# Patient Record
Sex: FEMALE | Race: WHITE | Employment: OTHER | URBAN - NONMETROPOLITAN AREA
[De-identification: names, ages, dates, MRNs, and addresses within clinical notes are randomized per-mention and may not be internally consistent; named-entity substitution may affect disease eponyms.]

---

## 2018-04-09 ENCOUNTER — APPOINTMENT (OUTPATIENT)
Dept: CT IMAGING | Age: 48
DRG: 287 | End: 2018-04-09
Payer: MEDICARE

## 2018-04-09 ENCOUNTER — HOSPITAL ENCOUNTER (INPATIENT)
Age: 48
LOS: 1 days | Discharge: HOME | DRG: 287 | End: 2018-04-10
Attending: INTERNAL MEDICINE | Admitting: INTERNAL MEDICINE
Payer: MEDICARE

## 2018-04-09 ENCOUNTER — APPOINTMENT (OUTPATIENT)
Dept: GENERAL RADIOLOGY | Age: 48
DRG: 287 | End: 2018-04-09
Payer: MEDICARE

## 2018-04-09 DIAGNOSIS — F41.1 ANXIETY, GENERALIZED: ICD-10-CM

## 2018-04-09 DIAGNOSIS — R07.9 CHEST PAIN, UNSPECIFIED TYPE: Primary | ICD-10-CM

## 2018-04-09 LAB
ALBUMIN SERPL-MCNC: 4.1 G/DL (ref 3.5–5.1)
ALP BLD-CCNC: 85 U/L (ref 38–126)
ALT SERPL-CCNC: 42 U/L (ref 11–66)
ANION GAP SERPL CALCULATED.3IONS-SCNC: 17 MEQ/L (ref 8–16)
AST SERPL-CCNC: 38 U/L (ref 5–40)
BASOPHILS # BLD: 0.7 %
BASOPHILS ABSOLUTE: 0.1 THOU/MM3 (ref 0–0.1)
BILIRUB SERPL-MCNC: 0.3 MG/DL (ref 0.3–1.2)
BUN BLDV-MCNC: 10 MG/DL (ref 7–22)
CALCIUM SERPL-MCNC: 9.3 MG/DL (ref 8.5–10.5)
CHLORIDE BLD-SCNC: 99 MEQ/L (ref 98–111)
CO2: 21 MEQ/L (ref 23–33)
CREAT SERPL-MCNC: 0.5 MG/DL (ref 0.4–1.2)
D-DIMER QUANTITATIVE: 838 NG/ML FEU (ref 0–500)
EKG ATRIAL RATE: 100 BPM
EKG ATRIAL RATE: 100 BPM
EKG P AXIS: 54 DEGREES
EKG P AXIS: 54 DEGREES
EKG P-R INTERVAL: 160 MS
EKG P-R INTERVAL: 164 MS
EKG Q-T INTERVAL: 346 MS
EKG Q-T INTERVAL: 360 MS
EKG QRS DURATION: 84 MS
EKG QRS DURATION: 88 MS
EKG QTC CALCULATION (BAZETT): 446 MS
EKG QTC CALCULATION (BAZETT): 464 MS
EKG R AXIS: -23 DEGREES
EKG R AXIS: -4 DEGREES
EKG T AXIS: 15 DEGREES
EKG T AXIS: 51 DEGREES
EKG VENTRICULAR RATE: 100 BPM
EKG VENTRICULAR RATE: 100 BPM
EOSINOPHIL # BLD: 0.4 %
EOSINOPHILS ABSOLUTE: 0 THOU/MM3 (ref 0–0.4)
GFR SERPL CREATININE-BSD FRML MDRD: > 90 ML/MIN/1.73M2
GLUCOSE BLD-MCNC: 208 MG/DL (ref 70–108)
HCT VFR BLD CALC: 47.2 % (ref 37–47)
HEMOGLOBIN: 15.9 GM/DL (ref 12–16)
LIPASE: 26.7 U/L (ref 5.6–51.3)
LYMPHOCYTES # BLD: 20.9 %
LYMPHOCYTES ABSOLUTE: 1.9 THOU/MM3 (ref 1–4.8)
MCH RBC QN AUTO: 28.8 PG (ref 27–31)
MCHC RBC AUTO-ENTMCNC: 33.7 GM/DL (ref 33–37)
MCV RBC AUTO: 85.5 FL (ref 81–99)
MONOCYTES # BLD: 6.2 %
MONOCYTES ABSOLUTE: 0.6 THOU/MM3 (ref 0.4–1.3)
NUCLEATED RED BLOOD CELLS: 0 /100 WBC
OSMOLALITY CALCULATION: 278.9 MOSMOL/KG (ref 275–300)
PDW BLD-RTO: 13.5 % (ref 11.5–14.5)
PLATELET # BLD: 164 THOU/MM3 (ref 130–400)
PMV BLD AUTO: 10.7 FL (ref 7.4–10.4)
POTASSIUM SERPL-SCNC: 4 MEQ/L (ref 3.5–5.2)
RBC # BLD: 5.52 MILL/MM3 (ref 4.2–5.4)
SEG NEUTROPHILS: 71.8 %
SEGMENTED NEUTROPHILS ABSOLUTE COUNT: 6.6 THOU/MM3 (ref 1.8–7.7)
SODIUM BLD-SCNC: 137 MEQ/L (ref 135–145)
TOTAL PROTEIN: 7.4 G/DL (ref 6.1–8)
TROPONIN T: < 0.01 NG/ML
WBC # BLD: 9.2 THOU/MM3 (ref 4.8–10.8)

## 2018-04-09 PROCEDURE — 96365 THER/PROPH/DIAG IV INF INIT: CPT

## 2018-04-09 PROCEDURE — 71046 X-RAY EXAM CHEST 2 VIEWS: CPT

## 2018-04-09 PROCEDURE — 96376 TX/PRO/DX INJ SAME DRUG ADON: CPT

## 2018-04-09 PROCEDURE — 99285 EMERGENCY DEPT VISIT HI MDM: CPT

## 2018-04-09 PROCEDURE — 99218 PR INITIAL OBSERVATION CARE/DAY 30 MINUTES: CPT | Performed by: INTERNAL MEDICINE

## 2018-04-09 PROCEDURE — 6360000002 HC RX W HCPCS: Performed by: INTERNAL MEDICINE

## 2018-04-09 PROCEDURE — 6360000004 HC RX CONTRAST MEDICATION: Performed by: PHYSICIAN ASSISTANT

## 2018-04-09 PROCEDURE — 6370000000 HC RX 637 (ALT 250 FOR IP): Performed by: PHYSICIAN ASSISTANT

## 2018-04-09 PROCEDURE — 2580000003 HC RX 258: Performed by: INTERNAL MEDICINE

## 2018-04-09 PROCEDURE — 85025 COMPLETE CBC W/AUTO DIFF WBC: CPT

## 2018-04-09 PROCEDURE — G0378 HOSPITAL OBSERVATION PER HR: HCPCS

## 2018-04-09 PROCEDURE — 96372 THER/PROPH/DIAG INJ SC/IM: CPT

## 2018-04-09 PROCEDURE — 84484 ASSAY OF TROPONIN QUANT: CPT

## 2018-04-09 PROCEDURE — 1200000003 HC TELEMETRY R&B

## 2018-04-09 PROCEDURE — 93005 ELECTROCARDIOGRAM TRACING: CPT | Performed by: INTERNAL MEDICINE

## 2018-04-09 PROCEDURE — 6370000000 HC RX 637 (ALT 250 FOR IP): Performed by: INTERNAL MEDICINE

## 2018-04-09 PROCEDURE — 36415 COLL VENOUS BLD VENIPUNCTURE: CPT

## 2018-04-09 PROCEDURE — 96375 TX/PRO/DX INJ NEW DRUG ADDON: CPT

## 2018-04-09 PROCEDURE — 2500000003 HC RX 250 WO HCPCS: Performed by: INTERNAL MEDICINE

## 2018-04-09 PROCEDURE — 93005 ELECTROCARDIOGRAM TRACING: CPT | Performed by: PHYSICIAN ASSISTANT

## 2018-04-09 PROCEDURE — 85379 FIBRIN DEGRADATION QUANT: CPT

## 2018-04-09 PROCEDURE — 6360000002 HC RX W HCPCS: Performed by: PHYSICIAN ASSISTANT

## 2018-04-09 PROCEDURE — 96374 THER/PROPH/DIAG INJ IV PUSH: CPT

## 2018-04-09 PROCEDURE — 96366 THER/PROPH/DIAG IV INF ADDON: CPT

## 2018-04-09 PROCEDURE — 80053 COMPREHEN METABOLIC PANEL: CPT

## 2018-04-09 PROCEDURE — S0028 INJECTION, FAMOTIDINE, 20 MG: HCPCS | Performed by: INTERNAL MEDICINE

## 2018-04-09 PROCEDURE — 83690 ASSAY OF LIPASE: CPT

## 2018-04-09 PROCEDURE — 71275 CT ANGIOGRAPHY CHEST: CPT

## 2018-04-09 RX ORDER — ACETAMINOPHEN 325 MG/1
650 TABLET ORAL EVERY 4 HOURS PRN
Status: DISCONTINUED | OUTPATIENT
Start: 2018-04-09 | End: 2018-04-09 | Stop reason: SDUPTHER

## 2018-04-09 RX ORDER — PAROXETINE HYDROCHLORIDE 20 MG/1
40 TABLET, FILM COATED ORAL EVERY MORNING
Status: DISCONTINUED | OUTPATIENT
Start: 2018-04-10 | End: 2018-04-10 | Stop reason: HOSPADM

## 2018-04-09 RX ORDER — TIZANIDINE 4 MG/1
4 TABLET ORAL EVERY 6 HOURS PRN
Status: DISCONTINUED | OUTPATIENT
Start: 2018-04-09 | End: 2018-04-10 | Stop reason: HOSPADM

## 2018-04-09 RX ORDER — SODIUM CHLORIDE 0.9 % (FLUSH) 0.9 %
10 SYRINGE (ML) INJECTION EVERY 12 HOURS SCHEDULED
Status: DISCONTINUED | OUTPATIENT
Start: 2018-04-09 | End: 2018-04-10 | Stop reason: SDUPTHER

## 2018-04-09 RX ORDER — SODIUM CHLORIDE 0.9 % (FLUSH) 0.9 %
10 SYRINGE (ML) INJECTION EVERY 12 HOURS SCHEDULED
Status: DISCONTINUED | OUTPATIENT
Start: 2018-04-09 | End: 2018-04-09 | Stop reason: SDUPTHER

## 2018-04-09 RX ORDER — METOCLOPRAMIDE HYDROCHLORIDE 5 MG/ML
10 INJECTION INTRAMUSCULAR; INTRAVENOUS EVERY 6 HOURS PRN
Status: DISCONTINUED | OUTPATIENT
Start: 2018-04-09 | End: 2018-04-10 | Stop reason: HOSPADM

## 2018-04-09 RX ORDER — MORPHINE SULFATE 2 MG/ML
2 INJECTION, SOLUTION INTRAMUSCULAR; INTRAVENOUS ONCE
Status: DISCONTINUED | OUTPATIENT
Start: 2018-04-09 | End: 2018-04-09 | Stop reason: DRUGHIGH

## 2018-04-09 RX ORDER — SODIUM CHLORIDE 0.9 % (FLUSH) 0.9 %
10 SYRINGE (ML) INJECTION PRN
Status: DISCONTINUED | OUTPATIENT
Start: 2018-04-09 | End: 2018-04-10 | Stop reason: SDUPTHER

## 2018-04-09 RX ORDER — TIZANIDINE 4 MG/1
4 TABLET ORAL 3 TIMES DAILY
Status: DISCONTINUED | OUTPATIENT
Start: 2018-04-09 | End: 2018-04-09

## 2018-04-09 RX ORDER — DEXTROSE MONOHYDRATE 50 MG/ML
100 INJECTION, SOLUTION INTRAVENOUS PRN
Status: DISCONTINUED | OUTPATIENT
Start: 2018-04-09 | End: 2018-04-10 | Stop reason: HOSPADM

## 2018-04-09 RX ORDER — PREGABALIN 75 MG/1
150 CAPSULE ORAL EVERY 6 HOURS
Status: DISCONTINUED | OUTPATIENT
Start: 2018-04-09 | End: 2018-04-10 | Stop reason: HOSPADM

## 2018-04-09 RX ORDER — MORPHINE SULFATE 2 MG/ML
2 INJECTION, SOLUTION INTRAMUSCULAR; INTRAVENOUS ONCE
Status: COMPLETED | OUTPATIENT
Start: 2018-04-09 | End: 2018-04-09

## 2018-04-09 RX ORDER — NICOTINE 21 MG/24HR
1 PATCH, TRANSDERMAL 24 HOURS TRANSDERMAL DAILY
Status: DISCONTINUED | OUTPATIENT
Start: 2018-04-09 | End: 2018-04-10 | Stop reason: HOSPADM

## 2018-04-09 RX ORDER — MORPHINE SULFATE 2 MG/ML
2 INJECTION, SOLUTION INTRAMUSCULAR; INTRAVENOUS ONCE
Status: DISCONTINUED | OUTPATIENT
Start: 2018-04-09 | End: 2018-04-09

## 2018-04-09 RX ORDER — DEXTROSE MONOHYDRATE 25 G/50ML
12.5 INJECTION, SOLUTION INTRAVENOUS PRN
Status: DISCONTINUED | OUTPATIENT
Start: 2018-04-09 | End: 2018-04-10 | Stop reason: HOSPADM

## 2018-04-09 RX ORDER — SODIUM CHLORIDE 0.9 % (FLUSH) 0.9 %
10 SYRINGE (ML) INJECTION PRN
Status: DISCONTINUED | OUTPATIENT
Start: 2018-04-09 | End: 2018-04-09 | Stop reason: SDUPTHER

## 2018-04-09 RX ORDER — ONDANSETRON 2 MG/ML
4 INJECTION INTRAMUSCULAR; INTRAVENOUS EVERY 6 HOURS PRN
Status: DISCONTINUED | OUTPATIENT
Start: 2018-04-09 | End: 2018-04-10

## 2018-04-09 RX ORDER — CLOPIDOGREL BISULFATE 75 MG/1
75 TABLET ORAL ONCE
Status: COMPLETED | OUTPATIENT
Start: 2018-04-09 | End: 2018-04-09

## 2018-04-09 RX ORDER — SODIUM CHLORIDE 9 MG/ML
INJECTION, SOLUTION INTRAVENOUS CONTINUOUS
Status: DISCONTINUED | OUTPATIENT
Start: 2018-04-09 | End: 2018-04-10

## 2018-04-09 RX ORDER — LOSARTAN POTASSIUM 25 MG/1
50 TABLET ORAL DAILY
COMMUNITY
End: 2022-05-31 | Stop reason: ALTCHOICE

## 2018-04-09 RX ORDER — MORPHINE SULFATE 4 MG/ML
4 INJECTION, SOLUTION INTRAMUSCULAR; INTRAVENOUS ONCE
Status: DISCONTINUED | OUTPATIENT
Start: 2018-04-09 | End: 2018-04-10

## 2018-04-09 RX ORDER — METOPROLOL TARTRATE 50 MG/1
25 TABLET, FILM COATED ORAL 2 TIMES DAILY
Status: DISCONTINUED | OUTPATIENT
Start: 2018-04-09 | End: 2018-04-10 | Stop reason: HOSPADM

## 2018-04-09 RX ORDER — CLONAZEPAM 1 MG/1
1 TABLET ORAL 2 TIMES DAILY PRN
Status: DISCONTINUED | OUTPATIENT
Start: 2018-04-09 | End: 2018-04-10 | Stop reason: HOSPADM

## 2018-04-09 RX ORDER — ACETAMINOPHEN 325 MG/1
650 TABLET ORAL EVERY 4 HOURS PRN
Status: DISCONTINUED | OUTPATIENT
Start: 2018-04-09 | End: 2018-04-10

## 2018-04-09 RX ORDER — NICOTINE POLACRILEX 4 MG
15 LOZENGE BUCCAL PRN
Status: DISCONTINUED | OUTPATIENT
Start: 2018-04-09 | End: 2018-04-10 | Stop reason: HOSPADM

## 2018-04-09 RX ORDER — PERPHENAZINE 16 MG
TABLET ORAL DAILY
COMMUNITY
End: 2020-02-17

## 2018-04-09 RX ORDER — ONDANSETRON 4 MG/1
4 TABLET, ORALLY DISINTEGRATING ORAL ONCE
Status: COMPLETED | OUTPATIENT
Start: 2018-04-09 | End: 2018-04-09

## 2018-04-09 RX ORDER — NITROGLYCERIN 0.4 MG/1
0.4 TABLET SUBLINGUAL EVERY 5 MIN PRN
Status: DISCONTINUED | OUTPATIENT
Start: 2018-04-09 | End: 2018-04-09 | Stop reason: HOSPADM

## 2018-04-09 RX ORDER — TRAMADOL HYDROCHLORIDE 50 MG/1
50 TABLET ORAL EVERY 6 HOURS PRN
Status: DISCONTINUED | OUTPATIENT
Start: 2018-04-09 | End: 2018-04-10 | Stop reason: HOSPADM

## 2018-04-09 RX ORDER — MONTELUKAST SODIUM 10 MG/1
10 TABLET ORAL NIGHTLY
COMMUNITY
End: 2020-02-17

## 2018-04-09 RX ORDER — LORAZEPAM 2 MG/ML
1 INJECTION INTRAMUSCULAR ONCE
Status: COMPLETED | OUTPATIENT
Start: 2018-04-09 | End: 2018-04-09

## 2018-04-09 RX ORDER — HALOPERIDOL 5 MG/ML
1 INJECTION INTRAMUSCULAR EVERY 6 HOURS PRN
Status: DISCONTINUED | OUTPATIENT
Start: 2018-04-09 | End: 2018-04-10 | Stop reason: HOSPADM

## 2018-04-09 RX ADMIN — IOPAMIDOL 80 ML: 755 INJECTION, SOLUTION INTRAVENOUS at 10:30

## 2018-04-09 RX ADMIN — TRAMADOL HYDROCHLORIDE 50 MG: 50 TABLET, FILM COATED ORAL at 14:47

## 2018-04-09 RX ADMIN — NITROGLYCERIN 1 INCH: 20 OINTMENT TOPICAL at 21:49

## 2018-04-09 RX ADMIN — SODIUM CHLORIDE: 9 INJECTION, SOLUTION INTRAVENOUS at 14:41

## 2018-04-09 RX ADMIN — MORPHINE SULFATE 2 MG: 2 INJECTION, SOLUTION INTRAMUSCULAR; INTRAVENOUS at 17:57

## 2018-04-09 RX ADMIN — NITROGLYCERIN 1 INCH: 20 OINTMENT TOPICAL at 14:53

## 2018-04-09 RX ADMIN — TRAMADOL HYDROCHLORIDE 50 MG: 50 TABLET, FILM COATED ORAL at 21:49

## 2018-04-09 RX ADMIN — PREGABALIN 150 MG: 75 CAPSULE ORAL at 17:57

## 2018-04-09 RX ADMIN — NITROGLYCERIN 0.4 MG: 0.4 TABLET SUBLINGUAL at 14:47

## 2018-04-09 RX ADMIN — LORAZEPAM 1 MG: 2 INJECTION INTRAMUSCULAR; INTRAVENOUS at 10:59

## 2018-04-09 RX ADMIN — FAMOTIDINE 20 MG: 10 INJECTION, SOLUTION INTRAVENOUS at 21:49

## 2018-04-09 RX ADMIN — SODIUM CHLORIDE: 9 INJECTION, SOLUTION INTRAVENOUS at 21:49

## 2018-04-09 RX ADMIN — ONDANSETRON 4 MG: 4 TABLET, ORALLY DISINTEGRATING ORAL at 07:57

## 2018-04-09 RX ADMIN — METOPROLOL TARTRATE 25 MG: 50 TABLET, FILM COATED ORAL at 21:49

## 2018-04-09 RX ADMIN — ENOXAPARIN SODIUM 60 MG: 60 INJECTION SUBCUTANEOUS at 17:58

## 2018-04-09 RX ADMIN — PREGABALIN 150 MG: 75 CAPSULE ORAL at 23:59

## 2018-04-09 RX ADMIN — NITROGLYCERIN 0.4 MG: 0.4 TABLET SUBLINGUAL at 11:01

## 2018-04-09 RX ADMIN — FAMOTIDINE 20 MG: 10 INJECTION, SOLUTION INTRAVENOUS at 14:50

## 2018-04-09 RX ADMIN — NITROGLYCERIN 0.4 MG: 0.4 TABLET SUBLINGUAL at 08:07

## 2018-04-09 RX ADMIN — CLOPIDOGREL 75 MG: 75 TABLET, FILM COATED ORAL at 18:06

## 2018-04-09 ASSESSMENT — PAIN DESCRIPTION - LOCATION
LOCATION_2: BACK
LOCATION: CHEST
LOCATION: CHEST
LOCATION_2: GENERALIZED
LOCATION: CHEST;SHOULDER
LOCATION: CHEST
LOCATION_2: GENERALIZED
LOCATION: CHEST;SHOULDER

## 2018-04-09 ASSESSMENT — PAIN SCALES - GENERAL
PAINLEVEL_OUTOF10: 10
PAINLEVEL_OUTOF10: 4
PAINLEVEL_OUTOF10: 6
PAINLEVEL_OUTOF10: 9
PAINLEVEL_OUTOF10: 7

## 2018-04-09 ASSESSMENT — PAIN DESCRIPTION - ONSET
ONSET_2: ON-GOING
ONSET: ON-GOING

## 2018-04-09 ASSESSMENT — PAIN DESCRIPTION - PAIN TYPE
TYPE_2: CHRONIC PAIN
TYPE: ACUTE PAIN
TYPE_2: CHRONIC PAIN
TYPE_2: CHRONIC PAIN
TYPE: ACUTE PAIN

## 2018-04-09 ASSESSMENT — PAIN DESCRIPTION - PROGRESSION
CLINICAL_PROGRESSION_2: NOT CHANGED
CLINICAL_PROGRESSION: NOT CHANGED
CLINICAL_PROGRESSION: NOT CHANGED
CLINICAL_PROGRESSION: GRADUALLY IMPROVING

## 2018-04-09 ASSESSMENT — ENCOUNTER SYMPTOMS
PHOTOPHOBIA: 0
SHORTNESS OF BREATH: 1
ABDOMINAL PAIN: 0
NAUSEA: 1
DIARRHEA: 0
COUGH: 0
SORE THROAT: 0
RHINORRHEA: 0
VOMITING: 0
BACK PAIN: 0

## 2018-04-09 ASSESSMENT — PAIN DESCRIPTION - ORIENTATION
ORIENTATION_2: MID
ORIENTATION: LEFT
ORIENTATION: MID
ORIENTATION: LEFT

## 2018-04-09 ASSESSMENT — PAIN DESCRIPTION - FREQUENCY
FREQUENCY: CONTINUOUS

## 2018-04-09 ASSESSMENT — PAIN DESCRIPTION - DESCRIPTORS
DESCRIPTORS: ACHING
DESCRIPTORS: ACHING;HEAVINESS
DESCRIPTORS_2: ACHING
DESCRIPTORS: DISCOMFORT
DESCRIPTORS_2: ACHING
DESCRIPTORS: ACHING
DESCRIPTORS: ACHING;HEAVINESS

## 2018-04-09 ASSESSMENT — HEART SCORE: ECG: 0

## 2018-04-09 ASSESSMENT — PAIN DESCRIPTION - INTENSITY
RATING_2: 4
RATING_2: 6
RATING_2: 8

## 2018-04-09 ASSESSMENT — PAIN DESCRIPTION - DURATION: DURATION_2: CONTINUOUS

## 2018-04-10 ENCOUNTER — APPOINTMENT (OUTPATIENT)
Dept: CARDIAC CATH/INVASIVE PROCEDURES | Age: 48
DRG: 287 | End: 2018-04-10
Payer: MEDICARE

## 2018-04-10 VITALS
SYSTOLIC BLOOD PRESSURE: 113 MMHG | BODY MASS INDEX: 45.99 KG/M2 | TEMPERATURE: 98.2 F | RESPIRATION RATE: 18 BRPM | HEIGHT: 67 IN | OXYGEN SATURATION: 95 % | DIASTOLIC BLOOD PRESSURE: 61 MMHG | HEART RATE: 61 BPM | WEIGHT: 293 LBS

## 2018-04-10 PROBLEM — I20.0 UNSTABLE ANGINA (HCC): Status: ACTIVE | Noted: 2018-04-10

## 2018-04-10 PROBLEM — E11.69 DIABETES MELLITUS TYPE 2 IN OBESE (HCC): Status: ACTIVE | Noted: 2018-04-10

## 2018-04-10 PROBLEM — E66.9 DIABETES MELLITUS TYPE 2 IN OBESE (HCC): Status: ACTIVE | Noted: 2018-04-10

## 2018-04-10 PROBLEM — F17.200 TOBACCO USE DISORDER: Status: ACTIVE | Noted: 2018-04-10

## 2018-04-10 LAB
ABO: NORMAL
AMPHETAMINE+METHAMPHETAMINE URINE SCREEN: NEGATIVE
ANTIBODY SCREEN: NORMAL
AVERAGE GLUCOSE: 174 MG/DL (ref 70–126)
BARBITURATE QUANTITATIVE URINE: NEGATIVE
BENZODIAZEPINE QUANTITATIVE URINE: NEGATIVE
CANNABINOID QUANTITATIVE URINE: NEGATIVE
CHOLESTEROL, TOTAL: 178 MG/DL (ref 100–199)
COCAINE METABOLITE QUANTITATIVE URINE: NEGATIVE
EKG ATRIAL RATE: 54 BPM
EKG P AXIS: 48 DEGREES
EKG P-R INTERVAL: 188 MS
EKG Q-T INTERVAL: 442 MS
EKG QRS DURATION: 92 MS
EKG QTC CALCULATION (BAZETT): 419 MS
EKG R AXIS: 12 DEGREES
EKG T AXIS: 13 DEGREES
EKG VENTRICULAR RATE: 54 BPM
GLUCOSE BLD-MCNC: 115 MG/DL (ref 70–108)
GLUCOSE BLD-MCNC: 133 MG/DL (ref 70–108)
HBA1C MFR BLD: 7.8 % (ref 4.4–6.4)
HCT VFR BLD CALC: 40.5 % (ref 37–47)
HCT VFR BLD CALC: 41.7 % (ref 37–47)
HDLC SERPL-MCNC: 26 MG/DL
HEMOGLOBIN: 13.5 GM/DL (ref 12–16)
HEMOGLOBIN: 14 GM/DL (ref 12–16)
INR BLD: 0.99 (ref 0.85–1.13)
LACTIC ACID: 1 MMOL/L (ref 0.5–2.2)
LDL CHOLESTEROL CALCULATED: 95 MG/DL
LV EF: 55 %
LVEF MODALITY: NORMAL
MCH RBC QN AUTO: 28.7 PG (ref 27–31)
MCH RBC QN AUTO: 28.9 PG (ref 27–31)
MCHC RBC AUTO-ENTMCNC: 33.4 GM/DL (ref 33–37)
MCHC RBC AUTO-ENTMCNC: 33.6 GM/DL (ref 33–37)
MCV RBC AUTO: 85.9 FL (ref 81–99)
MCV RBC AUTO: 86.1 FL (ref 81–99)
OPIATES, URINE: POSITIVE
OXYCODONE: NEGATIVE
PDW BLD-RTO: 13.8 % (ref 11.5–14.5)
PDW BLD-RTO: 13.8 % (ref 11.5–14.5)
PHENCYCLIDINE QUANTITATIVE URINE: NEGATIVE
PLATELET # BLD: 150 THOU/MM3 (ref 130–400)
PLATELET # BLD: 155 THOU/MM3 (ref 130–400)
PMV BLD AUTO: 10.2 FL (ref 7.4–10.4)
PMV BLD AUTO: 10.3 FL (ref 7.4–10.4)
RBC # BLD: 4.71 MILL/MM3 (ref 4.2–5.4)
RBC # BLD: 4.84 MILL/MM3 (ref 4.2–5.4)
RH FACTOR: NORMAL
TRIGL SERPL-MCNC: 284 MG/DL (ref 0–199)
WBC # BLD: 7.6 THOU/MM3 (ref 4.8–10.8)
WBC # BLD: 8 THOU/MM3 (ref 4.8–10.8)

## 2018-04-10 PROCEDURE — 80307 DRUG TEST PRSMV CHEM ANLYZR: CPT

## 2018-04-10 PROCEDURE — B2151ZZ FLUOROSCOPY OF LEFT HEART USING LOW OSMOLAR CONTRAST: ICD-10-PCS | Performed by: INTERNAL MEDICINE

## 2018-04-10 PROCEDURE — 83605 ASSAY OF LACTIC ACID: CPT

## 2018-04-10 PROCEDURE — 85610 PROTHROMBIN TIME: CPT

## 2018-04-10 PROCEDURE — B2111ZZ FLUOROSCOPY OF MULTIPLE CORONARY ARTERIES USING LOW OSMOLAR CONTRAST: ICD-10-PCS | Performed by: INTERNAL MEDICINE

## 2018-04-10 PROCEDURE — 2580000003 HC RX 258: Performed by: INTERNAL MEDICINE

## 2018-04-10 PROCEDURE — 6370000000 HC RX 637 (ALT 250 FOR IP): Performed by: INTERNAL MEDICINE

## 2018-04-10 PROCEDURE — 86901 BLOOD TYPING SEROLOGIC RH(D): CPT

## 2018-04-10 PROCEDURE — 93005 ELECTROCARDIOGRAM TRACING: CPT | Performed by: INTERNAL MEDICINE

## 2018-04-10 PROCEDURE — 85027 COMPLETE CBC AUTOMATED: CPT

## 2018-04-10 PROCEDURE — 86850 RBC ANTIBODY SCREEN: CPT

## 2018-04-10 PROCEDURE — 83036 HEMOGLOBIN GLYCOSYLATED A1C: CPT

## 2018-04-10 PROCEDURE — 96372 THER/PROPH/DIAG INJ SC/IM: CPT

## 2018-04-10 PROCEDURE — 6360000002 HC RX W HCPCS

## 2018-04-10 PROCEDURE — 6360000002 HC RX W HCPCS: Performed by: INTERNAL MEDICINE

## 2018-04-10 PROCEDURE — 93306 TTE W/DOPPLER COMPLETE: CPT

## 2018-04-10 PROCEDURE — 99223 1ST HOSP IP/OBS HIGH 75: CPT | Performed by: INTERNAL MEDICINE

## 2018-04-10 PROCEDURE — 2500000003 HC RX 250 WO HCPCS: Performed by: INTERNAL MEDICINE

## 2018-04-10 PROCEDURE — 96366 THER/PROPH/DIAG IV INF ADDON: CPT

## 2018-04-10 PROCEDURE — 93458 L HRT ARTERY/VENTRICLE ANGIO: CPT | Performed by: INTERNAL MEDICINE

## 2018-04-10 PROCEDURE — S0028 INJECTION, FAMOTIDINE, 20 MG: HCPCS | Performed by: INTERNAL MEDICINE

## 2018-04-10 PROCEDURE — 4A023N7 MEASUREMENT OF CARDIAC SAMPLING AND PRESSURE, LEFT HEART, PERCUTANEOUS APPROACH: ICD-10-PCS | Performed by: INTERNAL MEDICINE

## 2018-04-10 PROCEDURE — C1769 GUIDE WIRE: HCPCS

## 2018-04-10 PROCEDURE — 96375 TX/PRO/DX INJ NEW DRUG ADDON: CPT

## 2018-04-10 PROCEDURE — 86900 BLOOD TYPING SEROLOGIC ABO: CPT

## 2018-04-10 PROCEDURE — 1200000003 HC TELEMETRY R&B

## 2018-04-10 PROCEDURE — C1894 INTRO/SHEATH, NON-LASER: HCPCS

## 2018-04-10 PROCEDURE — 96376 TX/PRO/DX INJ SAME DRUG ADON: CPT

## 2018-04-10 PROCEDURE — 80061 LIPID PANEL: CPT

## 2018-04-10 PROCEDURE — 82948 REAGENT STRIP/BLOOD GLUCOSE: CPT

## 2018-04-10 PROCEDURE — 2500000003 HC RX 250 WO HCPCS

## 2018-04-10 PROCEDURE — 36415 COLL VENOUS BLD VENIPUNCTURE: CPT

## 2018-04-10 PROCEDURE — 99239 HOSP IP/OBS DSCHRG MGMT >30: CPT | Performed by: INTERNAL MEDICINE

## 2018-04-10 RX ORDER — ONDANSETRON 2 MG/ML
4 INJECTION INTRAMUSCULAR; INTRAVENOUS EVERY 6 HOURS PRN
Status: DISCONTINUED | OUTPATIENT
Start: 2018-04-10 | End: 2018-04-10 | Stop reason: HOSPADM

## 2018-04-10 RX ORDER — SODIUM CHLORIDE 0.9 % (FLUSH) 0.9 %
10 SYRINGE (ML) INJECTION EVERY 12 HOURS SCHEDULED
Status: DISCONTINUED | OUTPATIENT
Start: 2018-04-10 | End: 2018-04-10 | Stop reason: HOSPADM

## 2018-04-10 RX ORDER — SODIUM CHLORIDE 9 MG/ML
INJECTION, SOLUTION INTRAVENOUS CONTINUOUS
Status: DISCONTINUED | OUTPATIENT
Start: 2018-04-10 | End: 2018-04-10

## 2018-04-10 RX ORDER — SODIUM CHLORIDE 9 MG/ML
INJECTION, SOLUTION INTRAVENOUS CONTINUOUS
Status: DISCONTINUED | OUTPATIENT
Start: 2018-04-10 | End: 2018-04-10 | Stop reason: HOSPADM

## 2018-04-10 RX ORDER — PANTOPRAZOLE SODIUM 40 MG/1
40 TABLET, DELAYED RELEASE ORAL
Status: DISCONTINUED | OUTPATIENT
Start: 2018-04-10 | End: 2018-04-10 | Stop reason: HOSPADM

## 2018-04-10 RX ORDER — PANTOPRAZOLE SODIUM 40 MG/1
40 TABLET, DELAYED RELEASE ORAL
Qty: 30 TABLET | Refills: 0 | Status: SHIPPED | OUTPATIENT
Start: 2018-04-11

## 2018-04-10 RX ORDER — SODIUM CHLORIDE 0.9 % (FLUSH) 0.9 %
10 SYRINGE (ML) INJECTION EVERY 12 HOURS SCHEDULED
Status: DISCONTINUED | OUTPATIENT
Start: 2018-04-10 | End: 2018-04-10

## 2018-04-10 RX ORDER — ACETAMINOPHEN 325 MG/1
650 TABLET ORAL EVERY 4 HOURS PRN
Status: DISCONTINUED | OUTPATIENT
Start: 2018-04-10 | End: 2018-04-10 | Stop reason: HOSPADM

## 2018-04-10 RX ORDER — SODIUM CHLORIDE 0.9 % (FLUSH) 0.9 %
10 SYRINGE (ML) INJECTION PRN
Status: DISCONTINUED | OUTPATIENT
Start: 2018-04-10 | End: 2018-04-10 | Stop reason: HOSPADM

## 2018-04-10 RX ORDER — SODIUM CHLORIDE 0.9 % (FLUSH) 0.9 %
10 SYRINGE (ML) INJECTION PRN
Status: DISCONTINUED | OUTPATIENT
Start: 2018-04-10 | End: 2018-04-10

## 2018-04-10 RX ORDER — ASPIRIN 81 MG/1
81 TABLET, CHEWABLE ORAL ONCE
Status: DISCONTINUED | OUTPATIENT
Start: 2018-04-10 | End: 2018-04-10

## 2018-04-10 RX ORDER — CLOPIDOGREL BISULFATE 75 MG/1
75 TABLET ORAL DAILY
Status: DISCONTINUED | OUTPATIENT
Start: 2018-04-10 | End: 2018-04-10 | Stop reason: HOSPADM

## 2018-04-10 RX ORDER — ALPRAZOLAM 0.5 MG/1
0.5 TABLET ORAL
Status: COMPLETED | OUTPATIENT
Start: 2018-04-10 | End: 2018-04-10

## 2018-04-10 RX ORDER — ATORVASTATIN CALCIUM 20 MG/1
20 TABLET, FILM COATED ORAL NIGHTLY
Status: DISCONTINUED | OUTPATIENT
Start: 2018-04-10 | End: 2018-04-10 | Stop reason: HOSPADM

## 2018-04-10 RX ADMIN — NITROGLYCERIN 1 INCH: 20 OINTMENT TOPICAL at 12:35

## 2018-04-10 RX ADMIN — CLOPIDOGREL 75 MG: 75 TABLET, FILM COATED ORAL at 12:02

## 2018-04-10 RX ADMIN — METOPROLOL TARTRATE 25 MG: 50 TABLET, FILM COATED ORAL at 20:42

## 2018-04-10 RX ADMIN — TRAMADOL HYDROCHLORIDE 50 MG: 50 TABLET, FILM COATED ORAL at 12:15

## 2018-04-10 RX ADMIN — FAMOTIDINE 20 MG: 10 INJECTION, SOLUTION INTRAVENOUS at 09:58

## 2018-04-10 RX ADMIN — SODIUM CHLORIDE: 9 INJECTION, SOLUTION INTRAVENOUS at 13:53

## 2018-04-10 RX ADMIN — ATORVASTATIN CALCIUM 20 MG: 20 TABLET, FILM COATED ORAL at 20:42

## 2018-04-10 RX ADMIN — SODIUM CHLORIDE: 9 INJECTION, SOLUTION INTRAVENOUS at 05:54

## 2018-04-10 RX ADMIN — PREGABALIN 150 MG: 75 CAPSULE ORAL at 04:30

## 2018-04-10 RX ADMIN — ONDANSETRON 4 MG: 2 INJECTION INTRAMUSCULAR; INTRAVENOUS at 01:14

## 2018-04-10 RX ADMIN — TRAMADOL HYDROCHLORIDE 50 MG: 50 TABLET, FILM COATED ORAL at 04:30

## 2018-04-10 RX ADMIN — PAROXETINE HYDROCHLORIDE 40 MG: 20 TABLET, FILM COATED ORAL at 09:08

## 2018-04-10 RX ADMIN — PREGABALIN 150 MG: 75 CAPSULE ORAL at 17:13

## 2018-04-10 RX ADMIN — Medication 10 ML: at 09:58

## 2018-04-10 RX ADMIN — ALPRAZOLAM 0.5 MG: 0.5 TABLET ORAL at 13:53

## 2018-04-10 RX ADMIN — ENOXAPARIN SODIUM 60 MG: 60 INJECTION SUBCUTANEOUS at 05:54

## 2018-04-10 RX ADMIN — PANTOPRAZOLE SODIUM 40 MG: 40 TABLET, DELAYED RELEASE ORAL at 12:02

## 2018-04-10 RX ADMIN — PREGABALIN 150 MG: 75 CAPSULE ORAL at 10:38

## 2018-04-10 RX ADMIN — METOPROLOL TARTRATE 25 MG: 50 TABLET, FILM COATED ORAL at 09:08

## 2018-04-10 ASSESSMENT — PAIN SCALES - GENERAL
PAINLEVEL_OUTOF10: 6
PAINLEVEL_OUTOF10: 6
PAINLEVEL_OUTOF10: 2
PAINLEVEL_OUTOF10: 4
PAINLEVEL_OUTOF10: 6

## 2018-04-10 ASSESSMENT — PAIN DESCRIPTION - INTENSITY: RATING_2: 6

## 2018-04-10 ASSESSMENT — PAIN DESCRIPTION - DESCRIPTORS: DESCRIPTORS: DISCOMFORT

## 2018-04-10 ASSESSMENT — PAIN DESCRIPTION - LOCATION
LOCATION_2: GENERALIZED
LOCATION: BACK
LOCATION: CHEST

## 2018-04-10 ASSESSMENT — PAIN DESCRIPTION - PAIN TYPE
TYPE: ACUTE PAIN
TYPE_2: CHRONIC PAIN
TYPE: ACUTE PAIN

## 2018-04-10 ASSESSMENT — PAIN DESCRIPTION - ORIENTATION: ORIENTATION: MID

## 2018-05-09 ENCOUNTER — OFFICE VISIT (OUTPATIENT)
Dept: CARDIOLOGY CLINIC | Age: 48
End: 2018-05-09
Payer: MEDICARE

## 2018-05-09 VITALS
WEIGHT: 293 LBS | SYSTOLIC BLOOD PRESSURE: 131 MMHG | HEART RATE: 69 BPM | DIASTOLIC BLOOD PRESSURE: 87 MMHG | BODY MASS INDEX: 45.99 KG/M2 | HEIGHT: 67 IN

## 2018-05-09 DIAGNOSIS — E78.5 DYSLIPIDEMIA: ICD-10-CM

## 2018-05-09 DIAGNOSIS — E11.69 DIABETES MELLITUS TYPE 2 IN OBESE (HCC): Primary | ICD-10-CM

## 2018-05-09 DIAGNOSIS — E66.9 DIABETES MELLITUS TYPE 2 IN OBESE (HCC): Primary | ICD-10-CM

## 2018-05-09 DIAGNOSIS — E66.01 MORBID OBESITY WITH BMI OF 50.0-59.9, ADULT (HCC): ICD-10-CM

## 2018-05-09 PROCEDURE — 2022F DILAT RTA XM EVC RTNOPTHY: CPT | Performed by: PHYSICIAN ASSISTANT

## 2018-05-09 PROCEDURE — 99213 OFFICE O/P EST LOW 20 MIN: CPT | Performed by: PHYSICIAN ASSISTANT

## 2018-05-09 PROCEDURE — G8598 ASA/ANTIPLAT THER USED: HCPCS | Performed by: PHYSICIAN ASSISTANT

## 2018-05-09 PROCEDURE — 3045F PR MOST RECENT HEMOGLOBIN A1C LEVEL 7.0-9.0%: CPT | Performed by: PHYSICIAN ASSISTANT

## 2018-05-09 PROCEDURE — 4004F PT TOBACCO SCREEN RCVD TLK: CPT | Performed by: PHYSICIAN ASSISTANT

## 2018-05-09 PROCEDURE — G8427 DOCREV CUR MEDS BY ELIG CLIN: HCPCS | Performed by: PHYSICIAN ASSISTANT

## 2018-05-09 PROCEDURE — G8417 CALC BMI ABV UP PARAM F/U: HCPCS | Performed by: PHYSICIAN ASSISTANT

## 2018-05-09 PROCEDURE — 1111F DSCHRG MED/CURRENT MED MERGE: CPT | Performed by: PHYSICIAN ASSISTANT

## 2018-05-09 RX ORDER — SULFAMETHOXAZOLE AND TRIMETHOPRIM 800; 160 MG/1; MG/1
1 TABLET ORAL 2 TIMES DAILY
COMMUNITY
End: 2019-05-25 | Stop reason: ALTCHOICE

## 2018-05-09 RX ORDER — FAMOTIDINE 20 MG/1
20 TABLET, FILM COATED ORAL DAILY
COMMUNITY
End: 2020-02-17

## 2018-09-19 ENCOUNTER — HOSPITAL ENCOUNTER (OUTPATIENT)
Dept: MRI IMAGING | Age: 48
Discharge: HOME | End: 2018-09-19
Payer: MEDICARE

## 2018-09-19 DIAGNOSIS — G35 MULTIPLE SCLEROSIS (HCC): ICD-10-CM

## 2018-09-19 LAB — POC CREATININE WHOLE BLOOD: 0.8 MG/DL (ref 0.5–1.2)

## 2018-09-19 PROCEDURE — 82565 ASSAY OF CREATININE: CPT

## 2018-09-19 PROCEDURE — 6360000004 HC RX CONTRAST MEDICATION: Performed by: PSYCHIATRY & NEUROLOGY

## 2018-09-19 PROCEDURE — 70553 MRI BRAIN STEM W/O & W/DYE: CPT

## 2018-09-19 PROCEDURE — 72156 MRI NECK SPINE W/O & W/DYE: CPT

## 2018-09-19 PROCEDURE — A9579 GAD-BASE MR CONTRAST NOS,1ML: HCPCS | Performed by: PSYCHIATRY & NEUROLOGY

## 2018-09-19 RX ADMIN — GADOTERIDOL 20 ML: 279.3 INJECTION, SOLUTION INTRAVENOUS at 19:32

## 2019-05-25 ENCOUNTER — HOSPITAL ENCOUNTER (EMERGENCY)
Age: 49
Discharge: HOME | End: 2019-05-25
Payer: MEDICARE

## 2019-05-25 VITALS
OXYGEN SATURATION: 97 % | SYSTOLIC BLOOD PRESSURE: 136 MMHG | BODY MASS INDEX: 45.99 KG/M2 | RESPIRATION RATE: 16 BRPM | DIASTOLIC BLOOD PRESSURE: 85 MMHG | HEIGHT: 67 IN | HEART RATE: 97 BPM | TEMPERATURE: 97.8 F | WEIGHT: 293 LBS

## 2019-05-25 DIAGNOSIS — J20.9 ACUTE BRONCHITIS, UNSPECIFIED ORGANISM: Primary | ICD-10-CM

## 2019-05-25 PROCEDURE — 99204 OFFICE O/P NEW MOD 45 MIN: CPT | Performed by: NURSE PRACTITIONER

## 2019-05-25 PROCEDURE — 99212 OFFICE O/P EST SF 10 MIN: CPT

## 2019-05-25 RX ORDER — ALBUTEROL SULFATE 90 UG/1
2 AEROSOL, METERED RESPIRATORY (INHALATION) EVERY 4 HOURS PRN
Qty: 1 INHALER | Refills: 0 | Status: SHIPPED | OUTPATIENT
Start: 2019-05-25 | End: 2022-05-31 | Stop reason: ALTCHOICE

## 2019-05-25 RX ORDER — PROMETHAZINE HYDROCHLORIDE AND CODEINE PHOSPHATE 6.25; 1 MG/5ML; MG/5ML
5 SYRUP ORAL 4 TIMES DAILY PRN
Qty: 60 ML | Refills: 0 | Status: SHIPPED | OUTPATIENT
Start: 2019-05-25 | End: 2019-05-28

## 2019-05-25 RX ORDER — DOXYCYCLINE HYCLATE 100 MG/1
100 CAPSULE ORAL 2 TIMES DAILY
Qty: 14 CAPSULE | Refills: 0 | Status: SHIPPED | OUTPATIENT
Start: 2019-05-25 | End: 2019-06-01

## 2019-05-25 RX ORDER — DEXTROMETHORPHAN HYDROBROMIDE AND PROMETHAZINE HYDROCHLORIDE 15; 6.25 MG/5ML; MG/5ML
5 SYRUP ORAL 4 TIMES DAILY PRN
Qty: 120 ML | Refills: 0 | Status: SHIPPED | OUTPATIENT
Start: 2019-05-25 | End: 2019-05-25 | Stop reason: ALTCHOICE

## 2019-05-25 ASSESSMENT — ENCOUNTER SYMPTOMS
SINUS PRESSURE: 1
SHORTNESS OF BREATH: 1
COUGH: 1
EYE DISCHARGE: 0
DIARRHEA: 0
EYE ITCHING: 0
NAUSEA: 0
SORE THROAT: 0
VOMITING: 0
RHINORRHEA: 1
CHEST TIGHTNESS: 1

## 2019-05-25 NOTE — ED PROVIDER NOTES
[facility]  Urgent Care Encounter       CHIEF COMPLAINT       Chief Complaint   Patient presents with    Cough     head congsestion, sore throat       Nurses Notes reviewed and I agree except as noted in the HPI. HISTORY OF PRESENT ILLNESS   Raven Vazquez is a 47 y.o. female who presents to the urgent care for complaints of cough, sinus congestion, head congestion, sore throat, chest tightness. This is been ongoing since yesterday. The patient does have a history of MS and has a compromised immune system she states. She denies nausea or vomiting. She is use over-the-counter medications for her symptoms. The patient states that she does have sinus infection 3 weeks ago, but got completely better from that. She denies any recorded fever, but does complain of chills. The patient states that she has had intermittent body aches and fatigue. HPI    REVIEW OF SYSTEMS     Review of Systems   Constitutional: Positive for chills and fatigue. Negative for activity change, appetite change and fever. HENT: Positive for congestion, postnasal drip, rhinorrhea and sinus pressure. Negative for sore throat. Eyes: Negative for discharge and itching. Respiratory: Positive for cough, chest tightness and shortness of breath. Cardiovascular: Negative for chest pain. Gastrointestinal: Negative for diarrhea, nausea and vomiting. Genitourinary: Negative for decreased urine volume and difficulty urinating. Musculoskeletal: Positive for arthralgias and myalgias. Skin: Negative for rash. Allergic/Immunologic: Negative for environmental allergies and food allergies. Neurological: Negative for headaches.        PAST MEDICAL HISTORY         Diagnosis Date    Anal pruritus     Cancer ([facility] [address]     cervical    Chronic pain syndrome     Depression     Double vision     Dysphagia     Esophageal stricture     FH: CAD (coronary artery disease)     GERD (gastroesophageal reflux disease) HOURS AS NEEDED, Disp-120 tablet, R-11      fluticasone (FLONASE) 50 MCG/ACT nasal spray USE 2 SPRAYS IN EACH NOSTRIL EVERY DAY, Disp-1 Bottle, R-11      metFORMIN (GLUCOPHAGE) 500 MG tablet Take 1,000 mg by mouth daily (with breakfast) Historical Med      Calcium Carbonate Antacid (TUMS PO) Take  by mouth as needed. pravastatin (PRAVACHOL) 40 MG tablet Take 40 mg by mouth daily. ALLERGIES     Patient is is allergic to ampyra [dalfampridine]; benadryl [diphenhydramine]; cortisone; solu-medrol [methylprednisolone]; and aspirin. Patients   There is no immunization history on file for this patient. FAMILY HISTORY     Patient's family history includes Cancer in her mother; Colon Cancer (age of onset: 61) in her maternal grandmother; Colon Polyps in her maternal grandmother and mother; Diabetes in her mother; Heart Attack in her father; Heart Disease in her mother; Other in her daughter and sister; Stroke in her mother. SOCIAL HISTORY     Patient  reports that she has been smoking cigarettes. She has a 0.50 pack-year smoking history. She has never used smokeless tobacco. She reports that she drinks alcohol. She reports that she does not use drugs. PHYSICAL EXAM     ED TRIAGE VITALS  BP: 136/85, Temp: 97.8 °F (36.6 °C), Pulse: 97, Resp: 16, SpO2: 97 %,Estimated body mass index is 48.24 kg/m² as calculated from the following:    Height as of this encounter: 5' 7\" (1.702 m). Weight as of this encounter: 308 lb (139.7 kg). ,No LMP recorded. Patient has had a hysterectomy. Physical Exam   Constitutional: She is oriented to person, place, and time. Vital signs are normal. She appears well-developed and well-nourished. She is active and cooperative. Non-toxic appearance. She does not have a sickly appearance. She appears ill. No distress. HENT:   Head: Normocephalic and atraumatic. Right Ear: Hearing and external ear normal. A middle ear effusion is present.    Left Ear: Hearing, external ear and ear canal normal. A middle ear effusion is present. Nose: Nose normal.   Mouth/Throat: Uvula is midline and mucous membranes are normal. Posterior oropharyngeal erythema present. Eyes: EOM and lids are normal.   Neck: Trachea normal and full passive range of motion without pain. Cardiovascular: Normal rate and regular rhythm. Pulmonary/Chest: Effort normal. She has decreased breath sounds. Musculoskeletal: Normal range of motion. Neurological: She is alert and oriented to person, place, and time. No sensory deficit. GCS eye subscore is 4. GCS verbal subscore is 5. GCS motor subscore is 6. Skin: Skin is warm, dry and intact. No rash noted. Psychiatric: She has a normal mood and affect. Her speech is normal and behavior is normal.       DIAGNOSTIC RESULTS     Labs:No results found for this visit on 05/25/19. IMAGING:    No orders to display         EKG:      URGENT CARE COURSE:     Vitals:    05/25/19 0809   BP: 136/85   Pulse: 97   Resp: 16   Temp: 97.8 °F (36.6 °C)   TempSrc: Temporal   SpO2: 97%   Weight: (!) 308 lb (139.7 kg)   Height: 5' 7\" (1.702 m)       Medications - No data to display         PROCEDURES:  None    FINAL IMPRESSION      1. Acute bronchitis, unspecified organism          DISPOSITION/ PLAN     The patient's physical exam is consistent with acute bronchitis due to an unspecified organism. She'll be treated with albuterol, doxycycline, Phenergan with codeine. The patient is to take the medication as prescribed/directed. The patient is to follow-up with their PCP in 2-3 days. They are to go to the ER for any worsening symptoms. The patient/caregiver were agreeable to this plan of care and voiced no concerns at time of discharge. The patient was ambulatory out of the department in stable condition.        PATIENT REFERRED TO:  Devika Herrera MD  [address] [facility]  [address] / [place] [address]      DISCHARGE MEDICATIONS:  Discharge Medication List as of 5/25/2019  8:26 AM      START taking these medications    Details   albuterol sulfate HFA (PROAIR HFA) 108 (90 Base) MCG/ACT inhaler Inhale 2 puffs into the lungs every 4 hours as needed for Wheezing or Shortness of Breath, Disp-1 Inhaler, R-0Normal      doxycycline hyclate (VIBRAMYCIN) 100 MG capsule Take 1 capsule by mouth 2 times daily for 7 days, Disp-14 capsule, R-0Normal      promethazine-codeine (PHENERGAN WITH CODEINE) 6.25-10 MG/5ML syrup Take 5 mLs by mouth 4 times daily as needed for Cough for up to 3 days. , Disp-60 mL, R-0Print             Discharge Medication List as of 5/25/2019  8:26 AM          Discharge Medication List as of 5/25/2019  8:26 AM          FLO Bishop CNP    (Please note that portions of this note were completed with a voice recognition program. Efforts were made to edit the dictations but occasionally words are mis-transcribed.)           FLO Bishop CNP  05/25/19 5748

## 2019-08-28 ENCOUNTER — HOSPITAL ENCOUNTER (OUTPATIENT)
Dept: MRI IMAGING | Age: 49
Discharge: HOME | End: 2019-08-28
Payer: MEDICARE

## 2019-08-28 DIAGNOSIS — G35 MULTIPLE SCLEROSIS (HCC): ICD-10-CM

## 2019-08-28 LAB — POC CREATININE WHOLE BLOOD: 0.8 MG/DL (ref 0.5–1.2)

## 2019-08-28 PROCEDURE — 82565 ASSAY OF CREATININE: CPT

## 2019-08-28 PROCEDURE — 6360000004 HC RX CONTRAST MEDICATION: Performed by: PSYCHIATRY & NEUROLOGY

## 2019-08-28 PROCEDURE — 70553 MRI BRAIN STEM W/O & W/DYE: CPT

## 2019-08-28 PROCEDURE — A9579 GAD-BASE MR CONTRAST NOS,1ML: HCPCS | Performed by: PSYCHIATRY & NEUROLOGY

## 2019-08-28 RX ADMIN — GADOTERIDOL 20 ML: 279.3 INJECTION, SOLUTION INTRAVENOUS at 19:38

## 2019-10-09 ENCOUNTER — HOSPITAL ENCOUNTER (EMERGENCY)
Age: 49
Discharge: HOME | End: 2019-10-09
Payer: MEDICARE

## 2019-10-09 VITALS
OXYGEN SATURATION: 98 % | DIASTOLIC BLOOD PRESSURE: 68 MMHG | RESPIRATION RATE: 20 BRPM | HEART RATE: 79 BPM | HEIGHT: 67 IN | BODY MASS INDEX: 45.99 KG/M2 | SYSTOLIC BLOOD PRESSURE: 108 MMHG | WEIGHT: 293 LBS | TEMPERATURE: 98.4 F

## 2019-10-09 DIAGNOSIS — M62.830 SPASM OF MUSCLE OF LOWER BACK: Primary | ICD-10-CM

## 2019-10-09 PROCEDURE — 6370000000 HC RX 637 (ALT 250 FOR IP): Performed by: EMERGENCY MEDICINE

## 2019-10-09 PROCEDURE — 96372 THER/PROPH/DIAG INJ SC/IM: CPT

## 2019-10-09 PROCEDURE — 2709999900 HC NON-CHARGEABLE SUPPLY

## 2019-10-09 PROCEDURE — 6360000002 HC RX W HCPCS: Performed by: EMERGENCY MEDICINE

## 2019-10-09 PROCEDURE — 99283 EMERGENCY DEPT VISIT LOW MDM: CPT

## 2019-10-09 RX ORDER — KETOROLAC TROMETHAMINE 30 MG/ML
60 INJECTION, SOLUTION INTRAMUSCULAR; INTRAVENOUS ONCE
Status: COMPLETED | OUTPATIENT
Start: 2019-10-09 | End: 2019-10-09

## 2019-10-09 RX ORDER — ORPHENADRINE CITRATE 30 MG/ML
60 INJECTION INTRAMUSCULAR; INTRAVENOUS ONCE
Status: COMPLETED | OUTPATIENT
Start: 2019-10-09 | End: 2019-10-09

## 2019-10-09 RX ORDER — HYDROCODONE BITARTRATE AND ACETAMINOPHEN 5; 325 MG/1; MG/1
1 TABLET ORAL ONCE
Status: COMPLETED | OUTPATIENT
Start: 2019-10-09 | End: 2019-10-09

## 2019-10-09 RX ADMIN — HYDROCODONE BITARTRATE AND ACETAMINOPHEN 1 TABLET: 5; 325 TABLET ORAL at 17:51

## 2019-10-09 RX ADMIN — ORPHENADRINE CITRATE 60 MG: 30 INJECTION INTRAMUSCULAR; INTRAVENOUS at 16:38

## 2019-10-09 RX ADMIN — KETOROLAC TROMETHAMINE 60 MG: 30 INJECTION, SOLUTION INTRAMUSCULAR at 16:37

## 2019-10-09 ASSESSMENT — ENCOUNTER SYMPTOMS
COLOR CHANGE: 0
BACK PAIN: 1
COUGH: 0
VOMITING: 0
SHORTNESS OF BREATH: 0
ABDOMINAL PAIN: 0
ABDOMINAL DISTENTION: 0
NAUSEA: 0
DIARRHEA: 0

## 2019-10-09 ASSESSMENT — PAIN SCALES - GENERAL
PAINLEVEL_OUTOF10: 6
PAINLEVEL_OUTOF10: 10
PAINLEVEL_OUTOF10: 6

## 2019-10-09 ASSESSMENT — PAIN DESCRIPTION - PAIN TYPE: TYPE: ACUTE PAIN

## 2019-10-09 ASSESSMENT — PAIN DESCRIPTION - DESCRIPTORS: DESCRIPTORS: SPASM

## 2019-10-09 ASSESSMENT — PAIN DESCRIPTION - LOCATION: LOCATION: GENERALIZED

## 2020-02-17 ENCOUNTER — HOSPITAL ENCOUNTER (EMERGENCY)
Age: 50
Discharge: HOME | End: 2020-02-17
Payer: MEDICARE

## 2020-02-17 VITALS
DIASTOLIC BLOOD PRESSURE: 99 MMHG | SYSTOLIC BLOOD PRESSURE: 141 MMHG | HEART RATE: 87 BPM | RESPIRATION RATE: 16 BRPM | OXYGEN SATURATION: 97 % | TEMPERATURE: 98.4 F

## 2020-02-17 PROCEDURE — 99213 OFFICE O/P EST LOW 20 MIN: CPT | Performed by: NURSE PRACTITIONER

## 2020-02-17 PROCEDURE — 99213 OFFICE O/P EST LOW 20 MIN: CPT

## 2020-02-17 RX ORDER — BACLOFEN 10 MG/1
10 TABLET ORAL 3 TIMES DAILY
COMMUNITY

## 2020-02-17 RX ORDER — PROMETHAZINE HYDROCHLORIDE AND CODEINE PHOSPHATE 6.25; 1 MG/5ML; MG/5ML
5 SYRUP ORAL 4 TIMES DAILY PRN
Qty: 120 ML | Refills: 0 | Status: SHIPPED | OUTPATIENT
Start: 2020-02-17 | End: 2020-02-24

## 2020-02-17 RX ORDER — AMOXICILLIN AND CLAVULANATE POTASSIUM 875; 125 MG/1; MG/1
1 TABLET, FILM COATED ORAL 2 TIMES DAILY
Qty: 20 TABLET | Refills: 0 | Status: SHIPPED | OUTPATIENT
Start: 2020-02-17 | End: 2020-02-27

## 2020-02-17 RX ORDER — VALACYCLOVIR HYDROCHLORIDE 500 MG/1
400 TABLET, FILM COATED ORAL 2 TIMES DAILY
COMMUNITY
End: 2022-05-31 | Stop reason: ALTCHOICE

## 2020-02-17 RX ORDER — EZETIMIBE 10 MG/1
10 TABLET ORAL DAILY
COMMUNITY

## 2020-02-17 ASSESSMENT — ENCOUNTER SYMPTOMS
SHORTNESS OF BREATH: 0
TROUBLE SWALLOWING: 0
SINUS PRESSURE: 0
NAUSEA: 0
BACK PAIN: 0
RHINORRHEA: 1
COUGH: 1
SINUS CONGESTION: 1
SORE THROAT: 0
VOMITING: 0
DIARRHEA: 0

## 2020-02-17 NOTE — ED PROVIDER NOTES
[facility]  Urgent Care Encounter       CHIEF COMPLAINT       Chief Complaint   Patient presents with    Cough    Sweats    Sinusitis       Nurses Notes reviewed and I agree except as noted in the HPI. HISTORY OF PRESENT ILLNESS   Radha Payne is a 47 y.o. female who presents to the urgent care center complaining of sinus congestion cough. Patient stated that she just completed some antibiotics have given by her primary care provider which appeared to be Ceftin. Patient states that she does not feel like she is gotten any better. Patient does complain of a productive sounding cough and states she has green mucus also sinus congestion and sweating. She denies chest pain at the present time. Patient is here with her daughter who is also sick with similar symptoms. Patient does complain of chest pain with coughing and sore throat. The history is provided by the patient. No  was used. Cough   Cough characteristics:  Productive  Sputum characteristics:  Green  Severity:  Moderate  Onset quality:  Gradual  Duration:  1 week  Timing:  Constant  Progression:  Unchanged  Chronicity:  New  Smoker: yes    Context: sick contacts    Relieved by:  Nothing  Worsened by:  Nothing  Ineffective treatments:  None tried (mucinex)  Associated symptoms: rhinorrhea and sinus congestion    Associated symptoms: no chest pain, no chills, no ear pain, no fever, no headaches, no rash, no shortness of breath and no sore throat    Rhinorrhea:     Quality:  Clear and bloody    Severity:  Mild    Duration:  1 week      REVIEW OF SYSTEMS     Review of Systems   Constitutional: Negative for activity change, appetite change, chills, fatigue and fever. HENT: Positive for congestion and rhinorrhea. Negative for ear pain, sinus pressure, sore throat and trouble swallowing. Respiratory: Positive for cough. Negative for shortness of breath. Cardiovascular: Negative for chest pain. cigarettes. She has a 0.50 pack-year smoking history. She has never used smokeless tobacco. She reports current alcohol use. She reports that she does not use drugs. PHYSICAL EXAM     ED TRIAGE VITALS  BP: (!) 141/99, Temp: 98.4 °F (36.9 °C), Pulse: 87, Resp: 16, SpO2: 97 %,Estimated body mass index is 48.55 kg/m² as calculated from the following:    Height as of 10/9/19: 5' 7\" (1.702 m). Weight as of 10/9/19: 310 lb (140.6 kg). ,No LMP recorded. Patient has had a hysterectomy. Physical Exam  Vitals signs and nursing note reviewed. Constitutional:       General: She is not in acute distress. Appearance: Normal appearance. She is well-developed and well-groomed. She is not ill-appearing, toxic-appearing or diaphoretic. HENT:      Head: Normocephalic. Right Ear: Hearing, tympanic membrane, ear canal and external ear normal. No drainage, swelling or tenderness. No mastoid tenderness. Tympanic membrane is not erythematous. Left Ear: Hearing, tympanic membrane, ear canal and external ear normal. No drainage, swelling or tenderness. No mastoid tenderness. Tympanic membrane is not erythematous. Nose: Nose normal.      Right Sinus: No maxillary sinus tenderness or frontal sinus tenderness. Left Sinus: No maxillary sinus tenderness or frontal sinus tenderness. Mouth/Throat:      Lips: Pink. Mouth: Mucous membranes are moist.      Pharynx: Uvula midline. Posterior oropharyngeal erythema present. No uvula swelling. Tonsils: No tonsillar exudate or tonsillar abscesses. Eyes:      Conjunctiva/sclera: Conjunctivae normal.      Pupils: Pupils are equal, round, and reactive to light. Neck:      Musculoskeletal: Full passive range of motion without pain and normal range of motion. No neck rigidity. Cardiovascular:      Rate and Rhythm: Normal rate and regular rhythm. Heart sounds: Normal heart sounds.    Pulmonary:      Effort: Pulmonary effort is normal. No accessory muscle usage. Breath sounds: Normal breath sounds. No decreased breath sounds, wheezing, rhonchi or rales. Abdominal:      General: Bowel sounds are normal.      Palpations: Abdomen is soft. Tenderness: There is no abdominal tenderness. There is no right CVA tenderness, left CVA tenderness or guarding. Negative signs include Schumacher's sign. Lymphadenopathy:      Head:      Right side of head: No submental, submandibular, tonsillar, preauricular, posterior auricular or occipital adenopathy. Left side of head: No submental, submandibular, tonsillar, preauricular, posterior auricular or occipital adenopathy. Cervical: No cervical adenopathy. Right cervical: No superficial, deep or posterior cervical adenopathy. Left cervical: No superficial, deep or posterior cervical adenopathy. Upper Body:      Right upper body: No supraclavicular adenopathy. Left upper body: No supraclavicular adenopathy. Skin:     General: Skin is warm and dry. Capillary Refill: Capillary refill takes less than 2 seconds. Findings: No rash. Neurological:      Mental Status: She is alert and oriented to person, place, and time. Psychiatric:         Mood and Affect: Mood normal.         Behavior: Behavior normal. Behavior is cooperative. DIAGNOSTIC RESULTS     Labs:No results found for this visit on 02/17/20. IMAGING:    No orders to display         EKG:      URGENT CARE COURSE:     Vitals:    02/17/20 1521   BP: (!) 141/99   Pulse: 87   Resp: 16   Temp: 98.4 °F (36.9 °C)   TempSrc: Temporal   SpO2: 97%       Medications - No data to display         PROCEDURES:  None    FINAL IMPRESSION      1. Acute non-recurrent maxillary sinusitis    2. Viral URI with cough          DISPOSITION/ PLAN      The patient/Patient representative was advised to rest, drink lots of fluids, take Motrin and Tylenol for any fever, chills generalized body aches.   The patient was also advised to monitor urine output for signs of dehydration. If the patient develops any chest pain, shortness of breath, neck pain or stiffness or abdominal pain or any other concerns, the patient is to call 911 or go to the emergency department for further evaluation. If the patient does not experience any of the above symptoms he is to follow-up with his primary care provider in 2-3 days for reevaluation. The patient/Patient representative are agreeable to the treatment plan at this time. The patient left the urgent care center in stable condition. PATIENT REFERRED TO:  Milan Vilchis MD  [address]  [address] [id number]      DISCHARGE MEDICATIONS:  Discharge Medication List as of 2/17/2020  4:13 PM      START taking these medications    Details   promethazine-codeine (PHENERGAN WITH CODEINE) 6.25-10 MG/5ML syrup Take 5 mLs by mouth 4 times daily as needed for Cough for up to 7 days. , Disp-120 mL, R-0Normal      amoxicillin-clavulanate (AUGMENTIN) 875-125 MG per tablet Take 1 tablet by mouth 2 times daily for 10 days, Disp-20 tablet, R-0Normal             Discharge Medication List as of 2/17/2020  4:13 PM      STOP taking these medications       famotidine (PEPCID) 20 MG tablet Comments:   Reason for Stopping:         montelukast (SINGULAIR) 10 MG tablet Comments:   Reason for Stopping:         Alpha-Lipoic Acid 600 MG CAPS Comments:   Reason for Stopping:         tiZANidine (ZANAFLEX) 4 MG tablet Comments:   Reason for Stopping:               Discharge Medication List as of 2/17/2020  4:13 PM        Controlled Substance Monitoring:    Acute and Chronic Pain Monitoring:   RX Monitoring 2/17/2020   Periodic Controlled Substance Monitoring No signs of potential drug abuse or diversion identified. ;Possible medication side effects, risk of tolerance/dependence & alternative treatments discussed.          FLO Lopes - CNP    (Please note that portions of this note were completed with a voice recognition

## 2021-09-23 ENCOUNTER — HOSPITAL ENCOUNTER (OUTPATIENT)
Dept: MRI IMAGING | Age: 51
Discharge: HOME | End: 2021-09-23
Payer: MEDICARE

## 2021-09-23 DIAGNOSIS — G35 RELAPSING REMITTING MULTIPLE SCLEROSIS (HCC): ICD-10-CM

## 2021-09-23 PROCEDURE — 6360000004 HC RX CONTRAST MEDICATION: Performed by: PSYCHIATRY & NEUROLOGY

## 2021-09-23 PROCEDURE — 70553 MRI BRAIN STEM W/O & W/DYE: CPT

## 2021-09-23 PROCEDURE — A9579 GAD-BASE MR CONTRAST NOS,1ML: HCPCS | Performed by: PSYCHIATRY & NEUROLOGY

## 2021-09-23 PROCEDURE — 72157 MRI CHEST SPINE W/O & W/DYE: CPT

## 2021-09-23 PROCEDURE — 72156 MRI NECK SPINE W/O & W/DYE: CPT

## 2021-09-23 RX ADMIN — GADOTERIDOL 20 ML: 279.3 INJECTION, SOLUTION INTRAVENOUS at 19:38

## 2021-10-10 PROBLEM — K64.9 HEMORRHOIDS: Status: ACTIVE | Noted: 2021-10-10

## 2021-10-10 PROBLEM — R79.89 ELEVATED LIVER FUNCTION TESTS: Status: ACTIVE | Noted: 2021-10-10

## 2021-10-10 PROBLEM — E55.9 VITAMIN D INSUFFICIENCY: Status: ACTIVE | Noted: 2021-10-10

## 2021-10-11 PROBLEM — L73.2 HIDRADENITIS SUPPURATIVA: Status: ACTIVE | Noted: 2021-10-11

## 2021-10-11 PROBLEM — R14.2 ERUCTATION: Status: ACTIVE | Noted: 2021-10-11

## 2021-10-11 PROBLEM — L29.0 PERIANAL PRURITUS: Status: ACTIVE | Noted: 2021-10-11

## 2021-10-11 PROBLEM — E28.2 PCOS (POLYCYSTIC OVARIAN SYNDROME): Status: ACTIVE | Noted: 2021-10-11

## 2021-10-11 PROBLEM — Z87.19 HISTORY OF FATTY INFILTRATION OF LIVER: Status: ACTIVE | Noted: 2021-10-11

## 2021-11-15 ENCOUNTER — HOSPITAL ENCOUNTER (EMERGENCY)
Age: 51
Discharge: HOME | End: 2021-11-15
Attending: EMERGENCY MEDICINE
Payer: MEDICARE

## 2021-11-15 VITALS
OXYGEN SATURATION: 94 % | HEIGHT: 66 IN | WEIGHT: 293 LBS | TEMPERATURE: 98.2 F | BODY MASS INDEX: 47.09 KG/M2 | DIASTOLIC BLOOD PRESSURE: 74 MMHG | SYSTOLIC BLOOD PRESSURE: 135 MMHG | HEART RATE: 71 BPM | RESPIRATION RATE: 18 BRPM

## 2021-11-15 DIAGNOSIS — G35 MULTIPLE SCLEROSIS (HCC): Primary | ICD-10-CM

## 2021-11-15 LAB
ALBUMIN SERPL-MCNC: 4.5 G/DL (ref 3.5–5.1)
ALP BLD-CCNC: 74 U/L (ref 38–126)
ALT SERPL-CCNC: 63 U/L (ref 11–66)
ANION GAP SERPL CALCULATED.3IONS-SCNC: 13 MEQ/L (ref 8–16)
AST SERPL-CCNC: 53 U/L (ref 5–40)
BASOPHILS # BLD: 0.6 %
BASOPHILS ABSOLUTE: 0.1 THOU/MM3 (ref 0–0.1)
BILIRUB SERPL-MCNC: 0.3 MG/DL (ref 0.3–1.2)
BILIRUBIN DIRECT: < 0.2 MG/DL (ref 0–0.3)
BILIRUBIN URINE: NEGATIVE
BLOOD, URINE: NEGATIVE
BUN BLDV-MCNC: 13 MG/DL (ref 7–22)
CALCIUM SERPL-MCNC: 9.8 MG/DL (ref 8.5–10.5)
CHARACTER, URINE: CLEAR
CHLORIDE BLD-SCNC: 105 MEQ/L (ref 98–111)
CO2: 21 MEQ/L (ref 23–33)
COLOR: YELLOW
CREAT SERPL-MCNC: 0.7 MG/DL (ref 0.4–1.2)
EKG ATRIAL RATE: 90 BPM
EKG P AXIS: 44 DEGREES
EKG P-R INTERVAL: 166 MS
EKG Q-T INTERVAL: 346 MS
EKG QRS DURATION: 74 MS
EKG QTC CALCULATION (BAZETT): 423 MS
EKG R AXIS: 26 DEGREES
EKG T AXIS: 52 DEGREES
EKG VENTRICULAR RATE: 90 BPM
EOSINOPHIL # BLD: 5.3 %
EOSINOPHILS ABSOLUTE: 0.4 THOU/MM3 (ref 0–0.4)
ERYTHROCYTE [DISTWIDTH] IN BLOOD BY AUTOMATED COUNT: 13.3 % (ref 11.5–14.5)
ERYTHROCYTE [DISTWIDTH] IN BLOOD BY AUTOMATED COUNT: 43.8 FL (ref 35–45)
GFR SERPL CREATININE-BSD FRML MDRD: 88 ML/MIN/1.73M2
GLUCOSE BLD-MCNC: 126 MG/DL (ref 70–108)
GLUCOSE URINE: >= 1000 MG/DL
HCT VFR BLD CALC: 50 % (ref 37–47)
HEMOGLOBIN: 16.3 GM/DL (ref 12–16)
IMMATURE GRANS (ABS): 0.03 THOU/MM3 (ref 0–0.07)
IMMATURE GRANULOCYTES: 0.4 %
KETONES, URINE: 15
LEUKOCYTE ESTERASE, URINE: NEGATIVE
LIPASE: 40.8 U/L (ref 5.6–51.3)
LYMPHOCYTES # BLD: 31.9 %
LYMPHOCYTES ABSOLUTE: 2.7 THOU/MM3 (ref 1–4.8)
MCH RBC QN AUTO: 29.5 PG (ref 26–33)
MCHC RBC AUTO-ENTMCNC: 32.6 GM/DL (ref 32.2–35.5)
MCV RBC AUTO: 90.4 FL (ref 81–99)
MONOCYTES # BLD: 6.5 %
MONOCYTES ABSOLUTE: 0.5 THOU/MM3 (ref 0.4–1.3)
NITRITE, URINE: NEGATIVE
NUCLEATED RED BLOOD CELLS: 0 /100 WBC
OSMOLALITY CALCULATION: 279.2 MOSMOL/KG (ref 275–300)
PH UA: 5.5 (ref 5–9)
PLATELET # BLD: 200 THOU/MM3 (ref 130–400)
PMV BLD AUTO: 11.7 FL (ref 9.4–12.4)
POTASSIUM REFLEX MAGNESIUM: 4.4 MEQ/L (ref 3.5–5.2)
PRO-BNP: 15.7 PG/ML (ref 0–900)
PROTEIN UA: NEGATIVE
RBC # BLD: 5.53 MILL/MM3 (ref 4.2–5.4)
SEG NEUTROPHILS: 55.3 %
SEGMENTED NEUTROPHILS ABSOLUTE COUNT: 4.6 THOU/MM3 (ref 1.8–7.7)
SODIUM BLD-SCNC: 139 MEQ/L (ref 135–145)
SPECIFIC GRAVITY, URINE: 1.02 (ref 1–1.03)
TOTAL PROTEIN: 7 G/DL (ref 6.1–8)
TROPONIN T: < 0.01 NG/ML
UROBILINOGEN, URINE: 0.2 EU/DL (ref 0–1)
WBC # BLD: 8.4 THOU/MM3 (ref 4.8–10.8)

## 2021-11-15 PROCEDURE — 99285 EMERGENCY DEPT VISIT HI MDM: CPT

## 2021-11-15 PROCEDURE — 85025 COMPLETE CBC W/AUTO DIFF WBC: CPT

## 2021-11-15 PROCEDURE — 80048 BASIC METABOLIC PNL TOTAL CA: CPT

## 2021-11-15 PROCEDURE — 6360000002 HC RX W HCPCS: Performed by: STUDENT IN AN ORGANIZED HEALTH CARE EDUCATION/TRAINING PROGRAM

## 2021-11-15 PROCEDURE — 81003 URINALYSIS AUTO W/O SCOPE: CPT

## 2021-11-15 PROCEDURE — 83880 ASSAY OF NATRIURETIC PEPTIDE: CPT

## 2021-11-15 PROCEDURE — 80076 HEPATIC FUNCTION PANEL: CPT

## 2021-11-15 PROCEDURE — 36415 COLL VENOUS BLD VENIPUNCTURE: CPT

## 2021-11-15 PROCEDURE — 93005 ELECTROCARDIOGRAM TRACING: CPT | Performed by: EMERGENCY MEDICINE

## 2021-11-15 PROCEDURE — 93010 ELECTROCARDIOGRAM REPORT: CPT | Performed by: NUCLEAR MEDICINE

## 2021-11-15 PROCEDURE — 84484 ASSAY OF TROPONIN QUANT: CPT

## 2021-11-15 PROCEDURE — 96374 THER/PROPH/DIAG INJ IV PUSH: CPT

## 2021-11-15 PROCEDURE — 96375 TX/PRO/DX INJ NEW DRUG ADDON: CPT

## 2021-11-15 PROCEDURE — 96361 HYDRATE IV INFUSION ADD-ON: CPT

## 2021-11-15 PROCEDURE — 83690 ASSAY OF LIPASE: CPT

## 2021-11-15 PROCEDURE — 2580000003 HC RX 258: Performed by: STUDENT IN AN ORGANIZED HEALTH CARE EDUCATION/TRAINING PROGRAM

## 2021-11-15 RX ORDER — 0.9 % SODIUM CHLORIDE 0.9 %
1000 INTRAVENOUS SOLUTION INTRAVENOUS ONCE
Status: COMPLETED | OUTPATIENT
Start: 2021-11-15 | End: 2021-11-15

## 2021-11-15 RX ORDER — METHYLPREDNISOLONE 4 MG/1
TABLET ORAL
Qty: 1 KIT | Refills: 0 | Status: SHIPPED | OUTPATIENT
Start: 2021-11-15 | End: 2021-11-21

## 2021-11-15 RX ORDER — ONDANSETRON 2 MG/ML
4 INJECTION INTRAMUSCULAR; INTRAVENOUS ONCE
Status: COMPLETED | OUTPATIENT
Start: 2021-11-15 | End: 2021-11-15

## 2021-11-15 RX ADMIN — HYDROMORPHONE HYDROCHLORIDE 1 MG: 1 INJECTION, SOLUTION INTRAMUSCULAR; INTRAVENOUS; SUBCUTANEOUS at 14:24

## 2021-11-15 RX ADMIN — ONDANSETRON 4 MG: 2 INJECTION INTRAMUSCULAR; INTRAVENOUS at 14:24

## 2021-11-15 RX ADMIN — SODIUM CHLORIDE 1000 ML: 9 INJECTION, SOLUTION INTRAVENOUS at 14:27

## 2021-11-15 ASSESSMENT — ENCOUNTER SYMPTOMS
GASTROINTESTINAL NEGATIVE: 1
EYES NEGATIVE: 1
RESPIRATORY NEGATIVE: 1

## 2021-11-15 ASSESSMENT — PAIN DESCRIPTION - PAIN TYPE
TYPE: ACUTE PAIN

## 2021-11-15 ASSESSMENT — PAIN SCALES - GENERAL
PAINLEVEL_OUTOF10: 10
PAINLEVEL_OUTOF10: 10
PAINLEVEL_OUTOF10: 7
PAINLEVEL_OUTOF10: 4

## 2021-11-15 ASSESSMENT — PAIN DESCRIPTION - ONSET: ONSET: SUDDEN

## 2021-11-15 ASSESSMENT — PAIN DESCRIPTION - ORIENTATION: ORIENTATION: LOWER

## 2021-11-15 ASSESSMENT — PAIN DESCRIPTION - LOCATION
LOCATION: BACK

## 2021-11-15 ASSESSMENT — PAIN DESCRIPTION - FREQUENCY: FREQUENCY: INTERMITTENT

## 2021-11-15 NOTE — ED NOTES
Urine specimen collected and sent to the lab. Vitals remain stable.       Conni Cowden, RN  11/15/21 1361

## 2021-11-15 NOTE — ED PROVIDER NOTES
[facility] ENCOUNTER          Pt Name: Elizabeth Beck  MRN: [id number]  [place] 1970  Date of evaluation: 11/15/2021  Treating Resident Physician: Treovn Díaz MD  Supervising Physician: Mallory Moreno MD.     [address] [place] [address]       Chief Complaint   Patient presents with    Spasms     History obtained from the patient. HISTORY OF PRESENT ILLNESS    HPI  Elizabeth Beck is a 45 y.o. female who presents to the emergency department for evaluation of back pain. Previous medical history of multiple sclerosis, chronic muscle spasms. Patient refers since last Saturday she has had sharp pain, intense, and/10, starting from paravertebral thoracic low back right side, radiated to periumbilical and epigastric area, associated with nausea, unbearable since 3 hours ago, no fever, no chest pain, no urinary symptoms: Patient refers chronic muscle spasms including back, face, elbows last episode requiring ED attention 2 years ago. The patient has no other acute complaints at this time. REVIEW OF SYSTEMS   Review of Systems   Constitutional: Negative. HENT: Negative. Eyes: Negative. Respiratory: Negative. Cardiovascular: Negative. Gastrointestinal: Negative. Genitourinary: Negative. Musculoskeletal: Negative. Neurological: Negative. Psychiatric/Behavioral: Negative.           PAST MEDICAL AND SURGICAL HISTORY     Past Medical History:   Diagnosis Date    Anal pruritus     Cancer ([facility] [address]     cervical    Chronic pain syndrome     Depression     Double vision     Dysphagia     Esophageal stricture     FH: CAD (coronary artery disease)     GERD (gastroesophageal reflux disease)     Hearing loss     Hematochezia     Hemorrhoid     Hidradenitis suppurativa     Hyperlipidemia     Loss of vision     Lupus (HCC)     Morbid obesity with BMI of 50.0-59.9, adult (HCC)     Multiple sclerosis (HCC)     Numbness and tingling     arms and legs    Polycystic ovary disease     Sinus infection     Tattoo     Tobacco dependence     Unspecified sleep apnea     Urinary incontinence      Past Surgical History:   Procedure Laterality Date    ABDOMEN SURGERY      lipoma    BREAST SURGERY       SECTION      COLONOSCOPY      DILATION AND CURETTAGE OF UTERUS      ENDOMETRIAL ABLATION      HEMORRHOID SURGERY      HYSTERECTOMY      partial    LAPAROSCOPY      TUBAL LIGATION      UPPER GASTROINTESTINAL ENDOSCOPY   w/ Dilation, w/ Dilation         MEDICATIONS   No current facility-administered medications for this encounter. Current Outpatient Medications:     methylPREDNISolone (MEDROL, PETRA,) 4 MG tablet, Take by mouth., Disp: 1 kit, Rfl: 0    polyethylene glycol (GLYCOLAX) 17 GM/SCOOP powder, Dispense 238 Gram Bottle. Use as Directed, Disp: 238 g, Rfl: 0    Liraglutide (VICTOZA) 18 MG/3ML SOPN SC injection, Inject 1.2 mg into the skin daily, Disp: , Rfl:     glimepiride (AMARYL) 2 MG tablet, Take 2 mg by mouth every morning (before breakfast), Disp: , Rfl:     empagliflozin (JARDIANCE) 25 MG tablet, Take 25 mg by mouth daily, Disp: , Rfl:     triamcinolone (KENALOG) 0.1 % cream, Apply topically 2 times daily Apply topically 2 times daily. , Disp: , Rfl:     baclofen (LIORESAL) 10 MG tablet, Take 10 mg by mouth 3 times daily, Disp: , Rfl:     ezetimibe (ZETIA) 10 MG tablet, Take 10 mg by mouth daily, Disp: , Rfl:     valACYclovir (VALTREX) 500 MG tablet, Take 400 mg by mouth 2 times daily (Patient not taking: Reported on 10/11/2021), Disp: , Rfl:     NONFORMULARY, Antibiotic ointment, Disp: , Rfl:     albuterol sulfate HFA (PROAIR HFA) 108 (90 Base) MCG/ACT inhaler, Inhale 2 puffs into the lungs every 4 hours as needed for Wheezing or Shortness of Breath (Patient not taking: Reported on 10/11/2021), Disp: 1 Inhaler, Rfl: 0    pantoprazole (PROTONIX) 40 MG tablet, Take 1 tablet by mouth every morning (before breakfast), Disp: 30 tablet, Rfl: 0    losartan (COZAAR) 25 MG tablet, Take 50 mg by mouth daily , Disp: , Rfl:     Teriflunomide (AUBAGIO) 14 MG TABS, Take 1 tablet by mouth nightly, Disp: , Rfl:     lidocaine (XYLOCAINE) 5 % ointment, Apply topically as needed. (Patient not taking: Reported on 10/11/2021), Disp: 6 Tube, Rfl: 3    LYRICA 150 MG capsule, TAKE ONE CAPSULE BY MOUTH FOUR TIMES DAILY, Disp: 120 capsule, Rfl: 0    Cholecalciferol (VITAMIN D) 2000 UNITS CAPS capsule, Take 50,000 capsules by mouth Twice a Week Sundays and , Disp: , Rfl:     clonazePAM (KLONOPIN) 1 MG tablet, TAKE 1 TABLET BY MOUTH TWICE DAILY AS NEEDED, Disp: 60 tablet, Rfl: 0    PARoxetine (PAXIL) 20 MG tablet, Take 40 mg by mouth every morning , Disp: , Rfl:     fluticasone (FLONASE) 50 MCG/ACT nasal spray, USE 2 SPRAYS IN EACH NOSTRIL EVERY DAY (Patient taking differently: USE 2 SPRAYS IN EACH NOSTRIL EVERY DAY PRN), Disp: 1 Bottle, Rfl: 11    metFORMIN (GLUCOPHAGE) 500 MG tablet, Take 1,000 mg by mouth daily (with breakfast) , Disp: , Rfl:     Calcium Carbonate Antacid (TUMS PO), Take  by mouth as needed. , Disp: , Rfl:     pravastatin (PRAVACHOL) 40 MG tablet, Take 40 mg by mouth daily. , Disp: , Rfl:       SOCIAL HISTORY     Social History     Social History Narrative    Not on file     Social History     Tobacco Use    Smoking status: Former Smoker     Packs/day: 0.10     Years: 5.00     Pack years: 0.50     Types: Cigarettes     Quit date: 2015     Years since quittin.8    Smokeless tobacco: Never Used   Substance Use Topics    Alcohol use: Yes     Alcohol/week: 0.0 standard drinks     Comment: occas.     Drug use: No         ALLERGIES     Allergies   Allergen Reactions    Ampyra [Dalfampridine]      Shut down kidneys    Benadryl [Diphenhydramine] Other (See Comments)     MAKES PATIENT HYPER    Celexa [Citalopram]     Cortisone      Emotional reactions    Cymbalta [Duloxetine Hcl]     Solu-Medrol [Methylprednisolone]     Aspirin Palpitations         FAMILY HISTORY     Family History   Problem Relation Age of Onset    Heart Disease Mother     Diabetes Mother     Cancer Mother         cervical    Stroke Mother     Colon Polyps Mother     Other Daughter         pituitary tumor--benign    Other Sister         Hashimoto's    Heart Attack Father     Colon Polyps Maternal Grandmother     Colon Cancer Maternal Grandmother 61         PREVIOUS RECORDS   Previous records reviewed: Medical history diabetes, hypertension, multiple sclerosis. Current medications statin, Metformin, clonazepam, Lyrica. No new allergies, denies recent trauma, no recent surgeries. PHYSICAL EXAM     ED Triage Vitals [11/15/21 1400]   BP Temp Temp Source Pulse Resp SpO2 Height Weight   (!) 134/98 98.2 °F (36.8 °C) Oral 98 18 98 % 5' 6\" (1.676 m) (!) 318 lb (144.2 kg)     Initial vital signs and nursing assessment reviewed and abnormal from High diastolic blood pressure, tachycardia. . Body mass index is 51.33 kg/m². Pulsoximetry is normal per my interpretation. Additional Vital Signs:  Vitals:    11/15/21 1640   BP: 135/74   Pulse: 71   Resp: 18   Temp:    SpO2: 94%       Physical Exam  Constitutional:       General: She is in acute distress. HENT:      Head: Normocephalic and atraumatic. Nose: Nose normal.      Mouth/Throat:      Mouth: Mucous membranes are moist.   Eyes:      Extraocular Movements: Extraocular movements intact. Pupils: Pupils are equal, round, and reactive to light. Cardiovascular:      Rate and Rhythm: Regular rhythm. Tachycardia present. Pulses: Normal pulses. Heart sounds: Normal heart sounds. Pulmonary:      Effort: Pulmonary effort is normal.      Breath sounds: Normal breath sounds. Abdominal:      General: Abdomen is flat. There is no distension. Palpations: Abdomen is soft. Tenderness: There is no guarding or rebound.       Comments: Difficult assessment due to pain intensity   Musculoskeletal:         General: No swelling. Normal range of motion. Cervical back: Normal range of motion and neck supple. Skin:     General: Skin is warm. Capillary Refill: Capillary refill takes less than 2 seconds. Neurological:      General: No focal deficit present. Mental Status: She is alert and oriented to person, place, and time. MEDICAL DECISION MAKING   Initial Assessment:   3 45year-old patient, previous history of MS associated with severe muscle spasms, not responding to initial treatment ambulatory baclofen, clonazepam, Lyrica physical examination tachycardia, no neurological focalization, consider start IV fluids, IV opioids, reassessment. Additionally we will rule out other causes of back pain has kidney stones, pancreatitis. 2. Clinical impression  a. Multiple sclerosis relapse/muscle spasm  b. Kidney stone rule out  c. Pancreatitis rule out.   Plan:    IV Dilaudid, IV Zofran, IV fluids   BC, BMP, urinalysis, troponin, EKG   Reassessment        ED RESULTS   Laboratory results:  Labs Reviewed   BASIC METABOLIC PANEL W/ REFLEX TO MG FOR LOW K - Abnormal; Notable for the following components:       Result Value    CO2 21 (*)     Glucose 126 (*)     All other components within normal limits   CBC WITH AUTO DIFFERENTIAL - Abnormal; Notable for the following components:    RBC 5.53 (*)     Hemoglobin 16.3 (*)     Hematocrit 50.0 (*)     All other components within normal limits   URINE RT REFLEX TO CULTURE - Abnormal; Notable for the following components:    Glucose, Ur >= 1000 (*)     Ketones, Urine 15 (*)     All other components within normal limits   GLOMERULAR FILTRATION RATE, ESTIMATED - Abnormal; Notable for the following components:    Est, Glom Filt Rate 88 (*)     All other components within normal limits   HEPATIC FUNCTION PANEL - Abnormal; Notable for the following components:    AST 53 (*)     All other components within normal limits   BRAIN NATRIURETIC PEPTIDE   TROPONIN   LIPASE   ANION GAP   OSMOLALITY       Radiologic studies results:  No orders to display       ED Medications administered this visit:   Medications   HYDROmorphone (DILAUDID) injection 1 mg (1 mg IntraVENous Given 11/15/21 1424)   ondansetron (ZOFRAN) injection 4 mg (4 mg IntraVENous Given 11/15/21 1424)   0.9 % sodium chloride bolus (0 mLs IntraVENous Stopped 11/15/21 1521)         ED COURSE      Reassessment 45year-old patient complaining of acute on chronic back pain, currently referring improved after initial IV analgesia. Laboratory tests rule out pancreatitis or kidney stones, normal hepatic function panel. Reassessment, neurological examination with no neurological focalization, patient will be discharged with close follow-up with neurologist, primary care physician, alarm signs, recommendations. Patient understands and agrees  Strict return precautions and follow up instructions were discussed with the patient prior to discharge, with which the patient agrees. MEDICATION CHANGES     Discharge Medication List as of 11/15/2021  4:59 PM      START taking these medications    Details   methylPREDNISolone (MEDROL, PETRA,) 4 MG tablet Take by mouth., Disp-1 kit, R-0Normal               FINAL DISPOSITION     Final diagnoses:   Multiple sclerosis (HCC)     Condition: condition: good  Dispo: Discharge to home      This transcription was electronically signed. Parts of this transcriptions may have been dictated by use of voice recognition software and electronically transcribed, and parts may have been transcribed with the assistance of an ED scribe. The transcription may contain errors not detected in proofreading. Please refer to my supervising physician's documentation if my documentation differs.     Electronically Signed: Royal Angeline MD, 11/15/21, 5:15 PM         Royal Angeline MD  Resident  11/15/21 6686    Attending Supervising Physician's Attestation Statement  I performed a history and physical examination on the patient and discussed the management with the resident physician. I reviewed and agree with the findings and plan as documented in his note unless described otherwise below. Pt presnet sto the ER with back/flank muscle spasms, h/o same, h/o ms, no acute weakness/numbness, no acute speech/vision change, no acute discoordination. Exam nonfocal.  Pt did transiently desat after dilaudid, observed here and now no further hypoxemia on RA. Pt declines admit/rehab, comfortable with dc home, counseled regarding need for f/u and ER return precautions.     Electronically signed by Yoko Vilchis MD on 11/16/21 at 8:59 PM SÁNCHEZ Peters MD  11/16/21 5501

## 2021-11-15 NOTE — ED NOTES
Pt oxygen saturation noted to be 82% when sleeping.  Pt placed on 2L NC at this time     Veronica Garcia, RN  11/15/21 2036

## 2021-11-15 NOTE — ED TRIAGE NOTES
Pt to ED by EMS with c/c muscle spasms. Pt states that muscle spasms started Saturday but has became more frequent today. Pt has hx MS. Pt states that spasm starts in her lower flank region and radiates into her abdomen. Pt appears to be having a muscle spasms ever 30 seconds. Vitals stable. EKG completed on arrival .Telemetry in place.

## 2021-11-15 NOTE — ED NOTES
Pt resting on cot. Pt states \"pain is starting to creep back up\". Provider notified. Vitals remain stable.  Call light in reach     Dinah Norwood RN  11/15/21 5159

## 2022-04-26 ENCOUNTER — HOSPITAL ENCOUNTER (OUTPATIENT)
Dept: MRI IMAGING | Age: 52
Discharge: HOME | End: 2022-04-26
Payer: MEDICARE

## 2022-04-26 DIAGNOSIS — G35 RELAPSING REMITTING MULTIPLE SCLEROSIS (HCC): ICD-10-CM

## 2022-04-26 LAB — POC CREATININE WHOLE BLOOD: 0.6 MG/DL (ref 0.5–1.2)

## 2022-04-26 PROCEDURE — 6360000004 HC RX CONTRAST MEDICATION: Performed by: PHYSICIAN ASSISTANT

## 2022-04-26 PROCEDURE — 70553 MRI BRAIN STEM W/O & W/DYE: CPT

## 2022-04-26 PROCEDURE — A9579 GAD-BASE MR CONTRAST NOS,1ML: HCPCS | Performed by: PHYSICIAN ASSISTANT

## 2022-04-26 PROCEDURE — 82565 ASSAY OF CREATININE: CPT

## 2022-04-26 RX ADMIN — GADOTERIDOL 20 ML: 279.3 INJECTION, SOLUTION INTRAVENOUS at 21:34

## 2022-06-22 RX ORDER — SUCRALFATE 1 G/1
1 TABLET ORAL
Qty: 120 TABLET | Refills: 1 | Status: SHIPPED | OUTPATIENT
Start: 2022-06-22 | End: 2022-09-08 | Stop reason: HOSPADM

## 2022-06-22 RX ORDER — ONDANSETRON 4 MG/1
4 TABLET, ORALLY DISINTEGRATING ORAL EVERY 8 HOURS PRN
Qty: 12 TABLET | Refills: 1 | Status: SHIPPED | OUTPATIENT
Start: 2022-06-22

## 2022-07-08 NOTE — PROGRESS NOTES
[facility]   Date Of Service: 7/13/2022  Provider: Gabrielle Rapp DO, DO  Name: Sudheer Ceja   MRN: [id number]    Chief Complaint(s)      Chief Complaint   Patient presents with    New Patient        History of Present illness (HPI)    Sudheer Ceja   is a(n)52 y.o. female with a hx of  has a past medical history of Anal pruritus, Cancer ([facility] [address] Chronic pain syndrome, Depression, Double vision, Dysphagia, Esophageal stricture, FH: CAD (coronary artery disease), GERD (gastroesophageal reflux disease), Hearing loss, Hematochezia, Hemorrhoid, Hidradenitis suppurativa, Hyperlipidemia, Loss of vision, Lupus ([address] Mild sleep apnea, Morbid obesity with BMI of 50.0-59.9, T2DM w/ peripheral neuropathy ,  Multiple sclerosis ([facility] [address] spinel stenosis (mild) - cervical spien / moderate to severe. Neuropathy, Numbness and tingling, Polycystic ovary disease, Raynaud disease, Sinus infection, Tattoo, Tobacco dependence, Unspecified sleep apnea, and Urinary incontinence. referred by Carlita Melara MD for evaluation of  osteoarthritis  Of multiple joints     Patient reported havin pain in the bilatera shoulder started around 10 years ago since 2021 she has noted increased pain of the elbows, hands/fingers (mcps, pips, 4th finger predominately), ankles, and feetl. Intermittent swelling of the hands and limited and painful ROm of the fingers. Difficulty getting on and off the rings. Prior evaluation by Rheumatology - Dr. Marie Stacy - diagnosed with osteoarthritis. currentlyw with  Pain up to 6.5/10 over the past week with constant variable pain in the elbows, and shoulder. Timing: mornings w/ stiffness. Aggravating factors: weather changes. Back - walking. Alleviating factors: ice pack's, yoga, tylenol  Current therapy: tylenol prn. AM stiffness lasting about 60 min, relief w/ movement. Swelling of the ing finger, and right 2nd MCP.      -- Rash bilat elbows - s/p dermatology evaluation w/ biopsy by Dr. Marie Nguyen (NOT psoriasis )    -- Reported h/o recurret knee dislocations. dobule jointed as a child. -- self reported hx of gout - diagnosed after presenting with podagra type symptoms at 24 y. o.a   -- raynauds of the feet started after covid in 2020 - + associated numbness/tingling w/ colro changes. -- Nasal sores recently , + hx of epistaxis since childhood. -- Diarrhea daily with 2-3 BM's per day, + urgency. Denies mocous , brbpr. c-scope negative. EGD w/ gastritis and duodenitis   -- Smoking 1/5 ppd. - smoking since 34 y. o.a  -- Insomnia secondary to pain. -- Cold sensation down the left leg at times.        Review of Systems    Review of Systems  Bolded data in ROS are positive     Constitutional: , Denies: Weight loss, Night sweats,  Fevers,   Fatigue  Eyes:  Denies: vision changes, Eye pain, Redness, Dry eyes, Photophobia  Ears: Denies: Loss of hearing,  Tinnitus , discharge  Nose:  Denies:  Persistent Congestion,    Mouth: Denies: , Pain with chewing, Dry mouth   Cardiac: Denies: Chest pain , Palpitations ,  Swelling of extremities , PND, Orthopnea  Pulmonary: Denies:  SOB, Wheezing, Cough,   GI: Denies: Appetite changes, Swallowing difficulties, Heartburn, Abdominal pain,  Nausea, Vomiting,   :  Denies Dysuria,  Genital sores, Urinary changes   Neuro:  Denies: Headaches,  memory loss, Seizures, Numbness,  Tingling,  Weakness  Endocrine: Denies: sensitivity, hair loss, Polyuria, Polydipsia, Polyphagia, + cold and hot intolerance   Skin: Denies:  Nail changes,  ,  Photosensitivity , Tightening of the skin,    Lymph/Heme : Denies: adenopathy , thrombosis, stroke,     PAST MEDICAL HISTORY     has a past medical history of Anal pruritus, Cancer (HCC), Chronic pain syndrome, Depression, Double vision, Dysphagia, Esophageal stricture, FH: CAD (coronary artery disease), GERD (gastroesophageal reflux disease), Hearing loss, Hematochezia, Hemorrhoid, Hidradenitis suppurativa, Hyperlipidemia, Loss of vision, Lupus ([facility] [address] Mild sleep apnea, Morbid obesity with BMI of 50.0-59.9, adult (HCC), Multiple sclerosis ([facility] [address] Neuropathy, Numbness and tingling, Polycystic ovary disease, Raynaud disease, Sinus infection, Tattoo, Tobacco dependence, Unspecified sleep apnea, and Urinary incontinence. PAST SURGICAL HISTORY     has a past surgical history that includes Hysterectomy;  section; Tubal ligation; Dilation and curettage of uterus; laparoscopy; Hemorrhoid surgery; Upper gastrointestinal endoscopy ( w/ Dilation, w/ Dilation); Colonoscopy (); Breast surgery; Endometrial ablation; and Abdomen surgery. FAMILY HISTORY      Family History   Problem Relation Age of Onset    Heart Disease Mother     Diabetes Mother     Cancer Mother         cervical    Stroke Mother     Colon Polyps Mother     Other Daughter         pituitary tumor--benign    Other Sister         Hashimoto's    Heart Attack Father     Colon Polyps Maternal Grandmother     Colon Cancer Maternal Grandmother 61       SOCIAL HISTORY     reports that she has been smoking cigarettes. She has a 0.50 pack-year smoking history. She has never used smokeless tobacco. She reports previous alcohol use. She reports that she does not use drugs.       ALLERGIES     Allergies   Allergen Reactions    Ampyra [Dalfampridine]      Shut down kidneys    Benadryl [Diphenhydramine] Other (See Comments)     MAKES PATIENT HYPER    Celecoxib     Celexa [Citalopram]     Cortisone      Emotional reactions    Cymbalta [Duloxetine Hcl]     Duloxetine     Solu-Medrol [Methylprednisolone]     Aspirin Palpitations       CURRENT MEDICATIONS      Current Outpatient Medications:     ondansetron (ZOFRAN ODT) 4 MG disintegrating tablet, Take 1 tablet by mouth every 8 hours as needed for Nausea or Vomiting, Disp: 12 tablet, Rfl: 1    sucralfate (CARAFATE) 1 GM tablet, Take 1 tablet by mouth 4 times daily (before meals and nightly), Disp: 120 tablet, Rfl: 1    losartan (COZAAR) 100 MG tablet, TAKE 1 TABLET BY MOUTH EVERY DAY, Disp: , Rfl:     metoprolol succinate (TOPROL XL) 25 MG extended release tablet, TAKE 1 TABLET BY MOUTH EVERY DAY, Disp: , Rfl:     Liraglutide (VICTOZA) 18 MG/3ML SOPN SC injection, , Disp: , Rfl:     metFORMIN (GLUCOPHAGE) 850 MG tablet, TAKE 1 TABLET BY MOUTH TWICE DAILY MEALS, Disp: , Rfl:     Cholecalciferol (VITAMIN D3) 1.25 MG (89164 UT) CAPS, Take by mouth daily, Disp: , Rfl:     Vitamins A & D (VITAMIN A & D) 47228-171 units TABS, Take by mouth Twice a Week, Disp: , Rfl:     bisacodyl (DULCOLAX) 5 MG EC tablet, See Prep Instructions, Disp: 4 tablet, Rfl: 0    polyethylene glycol (GLYCOLAX) 17 GM/SCOOP powder, Dispense 238 Gram Bottle. Use as Directed, Disp: 238 g, Rfl: 0    glimepiride (AMARYL) 2 MG tablet, Take 2 mg by mouth every morning (before breakfast), Disp: , Rfl:     empagliflozin (JARDIANCE) 25 MG tablet, Take 25 mg by mouth daily, Disp: , Rfl:     baclofen (LIORESAL) 10 MG tablet, Take 10 mg by mouth 3 times daily, Disp: , Rfl:     ezetimibe (ZETIA) 10 MG tablet, Take 10 mg by mouth daily, Disp: , Rfl:     pantoprazole (PROTONIX) 40 MG tablet, Take 1 tablet by mouth every morning (before breakfast), Disp: 30 tablet, Rfl: 0    Teriflunomide (AUBAGIO) 14 MG TABS, Take 1 tablet by mouth nightly, Disp: , Rfl:     LYRICA 150 MG capsule, TAKE ONE CAPSULE BY MOUTH FOUR TIMES DAILY, Disp: 120 capsule, Rfl: 0    PARoxetine (PAXIL) 20 MG tablet, Take 40 mg by mouth every morning , Disp: , Rfl:     fluticasone (FLONASE) 50 MCG/ACT nasal spray, USE 2 SPRAYS IN EACH NOSTRIL EVERY DAY (Patient taking differently: USE 2 SPRAYS IN EACH NOSTRIL EVERY DAY PRN), Disp: 1 Bottle, Rfl: 11    pravastatin (PRAVACHOL) 40 MG tablet, Take 40 mg by mouth daily.   , Disp: , Rfl:     PHYSICAL EXAMINATION / OBJECTIVE     Objective:  BP (!) 136/94 (Site: Right Upper Arm, Position: Sitting, Cuff Size: Large Adult)   Pulse 92   Ht 5' 7.01\" 50.0 11/15/2021 02:26 PM    MCV 90.4 11/15/2021 02:26 PM    MCH 29.5 11/15/2021 02:26 PM    MCHC 32.6 11/15/2021 02:26 PM    RDW 13.8 04/10/2018 12:53 PM     11/15/2021 02:26 PM       CMP  Lab Results   Component Value Date/Time    CALCIUM 9.8 11/15/2021 02:26 PM    LABALBU 4.5 11/15/2021 02:26 PM    PROT 7.0 11/15/2021 02:26 PM     11/15/2021 02:26 PM    K 4.4 11/15/2021 02:26 PM    CO2 21 11/15/2021 02:26 PM     11/15/2021 02:26 PM    BUN 13 11/15/2021 02:26 PM    CREATININE 0.7 11/15/2021 02:26 PM    ALKPHOS 74 11/15/2021 02:26 PM    ALT 63 11/15/2021 02:26 PM    AST 53 11/15/2021 02:26 PM       HgBA1c: No components found for: HGBA1C    Lab Results   Component Value Date    TSH 2.600 09/21/2021     Lab Results   Component Value Date/Time    VITD25 22 09/21/2021 10:22 AM         Lab Results   Component Value Date    ANASCRN None Detected 09/21/2021     Lab Results   Component Value Date    SSA SEE BELOW 09/21/2021     Lab Results   Component Value Date    SSB 0 09/21/2021     No results found for: ANTI-SMITH  No results found for: DSDNAAB   No results found for: ANTIRNP  No results found for: C3, C4  No results found for: CCPAB  Lab Results   Component Value Date    RF < 10 09/21/2021       No components found for: CANCASCRN, APANCASCRN  Lab Results   Component Value Date    SEDRATE 15 09/21/2021     No results found for: CRP    RADIOLOGY:           X-ray left elbow: Osteoarthritic changes    X-ray right elbow: 9/27/2021: Osteoarthritic changes    X-ray right hand: Mild arthrosis of the MCPs and IP joints. X-ray left hand: 9/27/2021: Mild arthrosis of the MCPs and IP joints.,  First CMC joint arthrosis. PROCEDURE: MRI CERVICAL SPINE W WO CONTRAST       CLINICAL INFORMATION: Relapsing remitting multiple sclerosis ([address] .  Neck and back pain with worsening numbness on the left side.       COMPARISON: MRI cervical spine dated 9/19/2018       TECHNIQUE: Sagittal T1, T2 and STIR sequences were obtained through the cervical spine. Axial fast and echo and gradient echo T2-weighted images were obtained.  Postcontrast axial and sagittal T1-weighted images were obtained. 20 mL ProHance was injected    in the right AC.       FINDINGS:   There is anatomic vertebral body height and alignment. There is a focal area of hyperintense T1 and T2 signal in the C7 vertebral body is evidence for a hemangioma. Marrow signal is otherwise within normal limits. The craniocervical junction appears    intact. No focal area of abnormal T2 signal is identified within the cervical spinal cord. There is no abnormal enhancement of the cervical spinal cord. Paraspinal soft tissues are unremarkable. At C2-3 there is no significant spinal canal or neuroforaminal stenosis. At C3-4 there is no significant spinal canal or neuroforaminal stenosis. At C4-5 there is no significant spinal canal stenosis. There is mild uncovertebral joint degenerative change on the right causing mild right neural foraminal stenosis. At C5-6 there is a disc bulge eccentric to the left which causes mild spinal canal stenosis and appears to contact the ventral aspect of spinal cord with minimal effacement on the left side. No abnormal signals identified within the cord. There is severe    right and moderate to severe left neural foraminal stenosis in association with uncovertebral joint degenerative change. At C6-7 there is no significant spinal canal or neuroforaminal stenosis. At C7-T1 there is no significant spinal canal or neuroforaminal stenosis.         Impression           1. No focal abnormal signal within the cervical spinal cord to suggest a demyelinating process.    2. Disc bulge eccentric to the left at C5-6 causing mild spinal canal stenosis and contacting the ventral aspect of the cord without abnormal signal cord as well as severe right and moderate severe left neuroforaminal stenosis at this level in    association with uncovertebral joint degenerative change.               ASSESSMENT/PLAN      1. Polyarthralgia    2. Osteoarthritis of multiple joints, unspecified osteoarthritis type    3. Sicca, unspecified type ([address]    4. Lateral epicondylitis of both elbows    5. Chronic midline low back pain with left-sided sciatica    6. Paresthesia and pain of both upper extremities        #1. Polyarthralgia   #2. Osteoarthritis of multiple joints - bilateral elbows, shoulders, hands (MCPs, PIPs)   #3.  sicca:    bilat hands, elbows, shoulder, ankles, toes, neck and back. , + AM stiffness, parestheai of the upper and lower extremities. + fatigue, dry mout, reported sicca sx's, nasal ulceration, persistent diarrhea, hair loss, raynauds bilat feet. No active synovitis on examination. + tender bilat shoulder, and mild pain in the hands. Patient w/ 5 points on beighton hypermobility score. Prior x-rays with mcp and pips narrowing (ayptical for primary osteoarthritis ). H/o gout at 24 y. o.a    - lower concern for systemic lupus given the recent negative liz.    - evaluate for crystalline arthorpathies - cppd, gouty arthritis, SpA given the spine pain, ctd - sicca sx's, polyarthralgia     -     Miscellaneous Sendout; Future - liz by IFA   -     CBC with Auto Differential; Future  -     Comprehensive Metabolic Panel; Future  -     Sedimentation Rate; Future  -     C-Reactive Protein; Future  -     IRON SATURATION; Future  -     Ferritin; Future  -     Uric Acid; Future  -     Magnesium; Future  -     Phosphorus; Future  -     XR LUMBAR SPINE (2-3 VIEWS); Future      4. Lateral epicondylitis of both elbows - ? Related to inflammatory process vs use.    - discussed trial of elbow bracing. 5. Chronic midline low back pain with left-sided sciatica -mild spondylosis appreciated within the thoracic spine from previous imaging per my read. -     XR LUMBAR SPINE (2-3 VIEWS); Future  -     XR SACROILIAC JOINTS (MIN 3 VIEWS); Future    6.  Paresthesia and pain of both upper extremities   - ? If related to the foraminal stenosis or compressive neuropathy in the lower extremities or ms related. Return in about 3 months (around 10/13/2022). Electronically signed by Rosalia Dickerson DO on 7/8/2022 at 12:36 PM    New Prescriptions    No medications on file       7/8/2022       The risks and benefits of my recommendations, as well as other treatment options, benefits and side effects were discussed with the patient today. Questions were answered. Thank you for allowing me to participate in the care of this patient. Please call if there are any questions.

## 2022-07-13 ENCOUNTER — OFFICE VISIT (OUTPATIENT)
Dept: RHEUMATOLOGY | Age: 52
End: 2022-07-13
Payer: MEDICARE

## 2022-07-13 VITALS
HEIGHT: 67 IN | DIASTOLIC BLOOD PRESSURE: 94 MMHG | SYSTOLIC BLOOD PRESSURE: 136 MMHG | BODY MASS INDEX: 45.99 KG/M2 | HEART RATE: 92 BPM | WEIGHT: 293 LBS | OXYGEN SATURATION: 97 %

## 2022-07-13 DIAGNOSIS — M77.11 LATERAL EPICONDYLITIS OF BOTH ELBOWS: ICD-10-CM

## 2022-07-13 DIAGNOSIS — M79.601 PARESTHESIA AND PAIN OF BOTH UPPER EXTREMITIES: ICD-10-CM

## 2022-07-13 DIAGNOSIS — M35.00 SICCA, UNSPECIFIED TYPE (HCC): ICD-10-CM

## 2022-07-13 DIAGNOSIS — M77.12 LATERAL EPICONDYLITIS OF BOTH ELBOWS: ICD-10-CM

## 2022-07-13 DIAGNOSIS — M25.50 POLYARTHRALGIA: Primary | ICD-10-CM

## 2022-07-13 DIAGNOSIS — M54.42 CHRONIC MIDLINE LOW BACK PAIN WITH LEFT-SIDED SCIATICA: ICD-10-CM

## 2022-07-13 DIAGNOSIS — M79.602 PARESTHESIA AND PAIN OF BOTH UPPER EXTREMITIES: ICD-10-CM

## 2022-07-13 DIAGNOSIS — M15.9 OSTEOARTHRITIS OF MULTIPLE JOINTS, UNSPECIFIED OSTEOARTHRITIS TYPE: ICD-10-CM

## 2022-07-13 DIAGNOSIS — G89.29 CHRONIC MIDLINE LOW BACK PAIN WITH LEFT-SIDED SCIATICA: ICD-10-CM

## 2022-07-13 DIAGNOSIS — R20.2 PARESTHESIA AND PAIN OF BOTH UPPER EXTREMITIES: ICD-10-CM

## 2022-07-13 PROCEDURE — G8427 DOCREV CUR MEDS BY ELIG CLIN: HCPCS | Performed by: INTERNAL MEDICINE

## 2022-07-13 PROCEDURE — G8417 CALC BMI ABV UP PARAM F/U: HCPCS | Performed by: INTERNAL MEDICINE

## 2022-07-13 PROCEDURE — 99204 OFFICE O/P NEW MOD 45 MIN: CPT | Performed by: INTERNAL MEDICINE

## 2022-07-13 RX ORDER — CLONAZEPAM 1 MG/1
1 TABLET ORAL 2 TIMES DAILY PRN
COMMUNITY

## 2022-07-13 NOTE — PATIENT INSTRUCTIONS
Patient Education        Tennis Elbow: Exercises  Introduction  Here are some examples of exercises for you to try. The exercises may be suggested for a condition or for rehabilitation. Start each exercise slowly. Ease off the exercises if you start to have pain. You will be told when to start these exercises and which ones will work bestfor you. How to do the exercises  Wrist flexor stretch    1. Extend your arm in front of you with your palm up. 2. Bend your wrist, pointing your hand toward the floor. 3. With your other hand, gently bend your wrist farther until you feel a mild to moderate stretch in your forearm. 4. Hold for at least 15 to 30 seconds. Repeat 2 to 4 times. Wrist extensor stretch    1. Repeat steps 1 to 4 of the stretch above but begin with your extended hand palm down. Ball or sock squeeze    1. Hold a tennis ball (or a rolled-up sock) in your hand. 2. Make a fist around the ball (or sock) and squeeze. 3. Hold for about 6 seconds, and then relax for up to 10 seconds. 4. Repeat 8 to 12 times. 5. Switch the ball (or sock) to your other hand and do 8 to 12 times. Wrist deviation    1. Sit so that your arm is supported but your hand hangs off the edge of a flat surface, such as a table. 2. Hold your hand out like you are shaking hands with someone. 3. Move your hand up and down. 4. Repeat this motion 8 to 12 times. 5. Switch arms. 6. Try to do this exercise twice with each hand. Wrist curls    1. Place your forearm on a table with your hand hanging over the edge of the table, palm up. 2. Place a 1- to 2-pound weight in your hand. This may be a dumbbell, a can of food, or a filled water bottle. 3. Slowly raise and lower the weight while keeping your forearm on the table and your palm facing up. 4. Repeat this motion 8 to 12 times. 5. Switch arms, and do steps 1 through 4.  6. Repeat with your hand facing down toward the floor. Switch arms. Biceps curls    1.  Sit leaning forward and keep alist of the medicines you take. How can you care for yourself at home?     Rest your fingers, wrist, and forearm. Try to stop or reduce any activity that causes elbow pain. You may have to rest your arm for weeks to months. Follow your doctor's directions for how long to rest.      Put ice or a cold pack on your elbow for 10 to 20 minutes at a time. Try to do this every 1 to 2 hours for the next 3 days (when you are awake) or until the swelling goes down. Put a thin cloth between the ice and your skin. You can try heat, or alternating heat and ice, after the first 3 days.      If your doctor gave you a brace or splint, use it as directed. A \"counterforce\" brace is a strap around your forearm, just below your elbow. It may ease the pressure on the tendon and spread force throughout your arm.      Prop up your elbow on pillows to help reduce swelling.      Follow your doctor's or physical therapist's directions for exercise.      Return to your usual activities slowly.      Try to prevent the problem. Learn the best techniques for your sport. For example, make sure the  on your tennis racquet is not too big for your hand. Try not to hit a tennis ball late in your swing.      If you work, consider asking your employer about new ways of doing your job if your elbow pain is caused by something you do at work. Medicines     Be safe with medicines. Read and follow all instructions on the label. ? If the doctor gave you a prescription medicine for pain, take it as prescribed. ? If you are not taking a prescription pain medicine, ask your doctor if you can take an over-the-counter medicine. When should you call for help? Call your doctor now or seek immediate medical care if:     Your pain is worse.      You cannot bend your elbow normally.      Your arm or hand is cool or pale or changes color.      You have tingling, weakness, or numbness in your hand and fingers.    Watch closely for changes in your health, and be sure to contact your doctor if:     You have work problems caused by your elbow pain.      Your pain is not better after 2 weeks. Where can you learn more? Go to [email or web address]://[email or web address].[email or web address] and sign in to your [employer] account. Enter [phone] in the [facility] box to learn more about \"Tennis Elbow: Care Instructions. \"     If you do not have an account, please click on the \"Sign Up Now\" link. Current as of: March 9, 2022               Content Version: 13.3  © 9093-0810 [employer] Inc[employer]. Care instructions adapted under license by [facility] ([facility]). If you have questions about a medical condition or this instruction, always ask your healthcare professional. [address] any warranty or liability for your use of this information.

## 2022-07-20 ENCOUNTER — HOSPITAL ENCOUNTER (OUTPATIENT)
Dept: GENERAL RADIOLOGY | Age: 52
Discharge: HOME | End: 2022-07-20
Payer: MEDICARE

## 2022-07-20 ENCOUNTER — HOSPITAL ENCOUNTER (OUTPATIENT)
Age: 52
Discharge: HOME | End: 2022-07-20
Payer: MEDICARE

## 2022-07-20 DIAGNOSIS — M15.9 OSTEOARTHRITIS OF MULTIPLE JOINTS, UNSPECIFIED OSTEOARTHRITIS TYPE: ICD-10-CM

## 2022-07-20 DIAGNOSIS — M25.50 POLYARTHRALGIA: ICD-10-CM

## 2022-07-20 DIAGNOSIS — G89.29 CHRONIC MIDLINE LOW BACK PAIN WITH LEFT-SIDED SCIATICA: ICD-10-CM

## 2022-07-20 DIAGNOSIS — M54.42 CHRONIC MIDLINE LOW BACK PAIN WITH LEFT-SIDED SCIATICA: ICD-10-CM

## 2022-07-20 LAB
ALBUMIN SERPL-MCNC: 4.4 G/DL (ref 3.5–5.1)
ALP BLD-CCNC: 80 U/L (ref 38–126)
ALT SERPL-CCNC: 60 U/L (ref 11–66)
ANION GAP SERPL CALCULATED.3IONS-SCNC: 15 MEQ/L (ref 8–16)
AST SERPL-CCNC: 65 U/L (ref 5–40)
BASOPHILS # BLD: 0.7 %
BASOPHILS ABSOLUTE: 0.1 THOU/MM3 (ref 0–0.1)
BILIRUB SERPL-MCNC: 0.3 MG/DL (ref 0.3–1.2)
BUN BLDV-MCNC: 18 MG/DL (ref 7–22)
C-REACTIVE PROTEIN: 0.54 MG/DL (ref 0–1)
CALCIUM SERPL-MCNC: 10.2 MG/DL (ref 8.5–10.5)
CHLORIDE BLD-SCNC: 104 MEQ/L (ref 98–111)
CO2: 20 MEQ/L (ref 23–33)
CREAT SERPL-MCNC: 0.6 MG/DL (ref 0.4–1.2)
EOSINOPHIL # BLD: 0 %
EOSINOPHILS ABSOLUTE: 0 THOU/MM3 (ref 0–0.4)
ERYTHROCYTE [DISTWIDTH] IN BLOOD BY AUTOMATED COUNT: 13.7 % (ref 11.5–14.5)
ERYTHROCYTE [DISTWIDTH] IN BLOOD BY AUTOMATED COUNT: 45.1 FL (ref 35–45)
FERRITIN: 349 NG/ML (ref 10–291)
GFR SERPL CREATININE-BSD FRML MDRD: > 90 ML/MIN/1.73M2
GLUCOSE BLD-MCNC: 108 MG/DL (ref 70–108)
HCT VFR BLD CALC: 50.7 % (ref 37–47)
HEMOGLOBIN: 16.1 GM/DL (ref 12–16)
IMMATURE GRANS (ABS): 0.04 THOU/MM3 (ref 0–0.07)
IMMATURE GRANULOCYTES: 0.4 %
IRON SATURATION: 27 % (ref 20–50)
IRON: 101 UG/DL (ref 50–170)
LYMPHOCYTES # BLD: 35.3 %
LYMPHOCYTES ABSOLUTE: 3.2 THOU/MM3 (ref 1–4.8)
MAGNESIUM: 2.2 MG/DL (ref 1.6–2.4)
MCH RBC QN AUTO: 28.8 PG (ref 26–33)
MCHC RBC AUTO-ENTMCNC: 31.8 GM/DL (ref 32.2–35.5)
MCV RBC AUTO: 90.5 FL (ref 81–99)
MONOCYTES # BLD: 6.8 %
MONOCYTES ABSOLUTE: 0.6 THOU/MM3 (ref 0.4–1.3)
NUCLEATED RED BLOOD CELLS: 0 /100 WBC
PHOSPHORUS: 3.8 MG/DL (ref 2.4–4.7)
PLATELET # BLD: 218 THOU/MM3 (ref 130–400)
PMV BLD AUTO: 11.7 FL (ref 9.4–12.4)
POTASSIUM SERPL-SCNC: 4.3 MEQ/L (ref 3.5–5.2)
RBC # BLD: 5.6 MILL/MM3 (ref 4.2–5.4)
SEDIMENTATION RATE, ERYTHROCYTE: 9 MM/HR (ref 0–20)
SEG NEUTROPHILS: 56.8 %
SEGMENTED NEUTROPHILS ABSOLUTE COUNT: 5.2 THOU/MM3 (ref 1.8–7.7)
SODIUM BLD-SCNC: 139 MEQ/L (ref 135–145)
TOTAL IRON BINDING CAPACITY: 370 UG/DL (ref 171–450)
TOTAL PROTEIN: 7.5 G/DL (ref 6.1–8)
URIC ACID: 4 MG/DL (ref 2.4–5.7)
WBC # BLD: 9.2 THOU/MM3 (ref 4.8–10.8)

## 2022-07-20 PROCEDURE — 72202 X-RAY EXAM SI JOINTS 3/> VWS: CPT

## 2022-07-20 PROCEDURE — 80053 COMPREHEN METABOLIC PANEL: CPT

## 2022-07-20 PROCEDURE — 85025 COMPLETE CBC W/AUTO DIFF WBC: CPT

## 2022-07-20 PROCEDURE — 83550 IRON BINDING TEST: CPT

## 2022-07-20 PROCEDURE — 84550 ASSAY OF BLOOD/URIC ACID: CPT

## 2022-07-20 PROCEDURE — 84100 ASSAY OF PHOSPHORUS: CPT

## 2022-07-20 PROCEDURE — 86140 C-REACTIVE PROTEIN: CPT

## 2022-07-20 PROCEDURE — 36415 COLL VENOUS BLD VENIPUNCTURE: CPT

## 2022-07-20 PROCEDURE — 83735 ASSAY OF MAGNESIUM: CPT

## 2022-07-20 PROCEDURE — 82728 ASSAY OF FERRITIN: CPT

## 2022-07-20 PROCEDURE — 83540 ASSAY OF IRON: CPT

## 2022-07-20 PROCEDURE — 85651 RBC SED RATE NONAUTOMATED: CPT

## 2022-07-20 PROCEDURE — 72100 X-RAY EXAM L-S SPINE 2/3 VWS: CPT

## 2022-07-20 PROCEDURE — 86039 ANTINUCLEAR ANTIBODIES (ANA): CPT

## 2022-07-21 ENCOUNTER — TELEPHONE (OUTPATIENT)
Dept: RHEUMATOLOGY | Age: 52
End: 2022-07-21

## 2022-07-21 NOTE — TELEPHONE ENCOUNTER
Pt req we fax lab results to her neurologist Dr Eron Franz she advised Dr Eron Franz that her liver function was elevated [phone]     Faxed to number above, pt called and informed voiced understanding     Pt stated her liver function may have been elevated due to the med her neurologist has her on   Aubagio 14 mg nightly she's been on this med for 4 years

## 2022-07-23 LAB — MISC. #1 REFERENCE GROUP TEST: NORMAL

## 2022-07-28 ENCOUNTER — HOSPITAL ENCOUNTER (OUTPATIENT)
Dept: ULTRASOUND IMAGING | Age: 52
Discharge: HOME | End: 2022-07-28
Payer: MEDICARE

## 2022-07-28 PROCEDURE — 76981 USE PARENCHYMA: CPT

## 2022-08-30 ENCOUNTER — HOSPITAL ENCOUNTER (OUTPATIENT)
Dept: NUCLEAR MEDICINE | Age: 52
Discharge: HOME | End: 2022-08-30
Payer: MEDICARE

## 2022-08-30 PROCEDURE — 78264 GASTRIC EMPTYING IMG STUDY: CPT

## 2022-08-30 PROCEDURE — 3430000000 HC RX DIAGNOSTIC RADIOPHARMACEUTICAL

## 2022-08-30 PROCEDURE — A9541 TC99M SULFUR COLLOID: HCPCS

## 2022-08-30 RX ADMIN — Medication 1 MILLICURIE: at 08:45

## 2023-03-30 ENCOUNTER — OFFICE VISIT (OUTPATIENT)
Dept: RHEUMATOLOGY | Age: 53
End: 2023-03-30
Payer: MEDICARE

## 2023-03-30 ENCOUNTER — NURSE ONLY (OUTPATIENT)
Dept: LAB | Age: 53
End: 2023-03-30

## 2023-03-30 VITALS
SYSTOLIC BLOOD PRESSURE: 136 MMHG | WEIGHT: 293 LBS | HEART RATE: 88 BPM | OXYGEN SATURATION: 96 % | BODY MASS INDEX: 45.99 KG/M2 | HEIGHT: 67 IN | DIASTOLIC BLOOD PRESSURE: 86 MMHG

## 2023-03-30 DIAGNOSIS — I73.00 RAYNAUD'S DISEASE WITHOUT GANGRENE: ICD-10-CM

## 2023-03-30 DIAGNOSIS — E66.01 CLASS 3 SEVERE OBESITY WITH SERIOUS COMORBIDITY AND BODY MASS INDEX (BMI) OF 45.0 TO 49.9 IN ADULT, UNSPECIFIED OBESITY TYPE (HCC): ICD-10-CM

## 2023-03-30 DIAGNOSIS — M15.9 OSTEOARTHRITIS OF MULTIPLE JOINTS, UNSPECIFIED OSTEOARTHRITIS TYPE: ICD-10-CM

## 2023-03-30 DIAGNOSIS — M25.50 POLYARTHRALGIA: ICD-10-CM

## 2023-03-30 DIAGNOSIS — M54.42 CHRONIC MIDLINE LOW BACK PAIN WITH LEFT-SIDED SCIATICA: ICD-10-CM

## 2023-03-30 DIAGNOSIS — G89.29 CHRONIC MIDLINE LOW BACK PAIN WITH LEFT-SIDED SCIATICA: ICD-10-CM

## 2023-03-30 DIAGNOSIS — M54.2 CERVICALGIA: ICD-10-CM

## 2023-03-30 DIAGNOSIS — M25.50 POLYARTHRALGIA: Primary | ICD-10-CM

## 2023-03-30 LAB
BILIRUB UR QL STRIP: NEGATIVE
CHARACTER UR: CLEAR
COLOR UR: YELLOW
CREAT UR-MCNC: 85.1 MG/DL
GLUCOSE UR QL STRIP.AUTO: >= 1000 MG/DL
HGB UR QL STRIP.AUTO: NEGATIVE
KETONES UR QL STRIP.AUTO: NEGATIVE
LEUKOCYTE ESTERASE UR QL STRIP.AUTO: NEGATIVE
NITRITE UR QL STRIP.AUTO: NEGATIVE
PH UR STRIP.AUTO: 5.5 [PH] (ref 5–9)
PROT UR STRIP.AUTO-MCNC: NEGATIVE MG/DL
PROT UR-MCNC: 10.1 MG/DL
PROT/CREAT 24H UR: 0.12 MG/G{CREAT}
SP GR UR REFRACT.AUTO: > 1.03 (ref 1–1.03)
UROBILINOGEN UR QL STRIP.AUTO: 0.2 EU/DL (ref 0–1)

## 2023-03-30 PROCEDURE — G8417 CALC BMI ABV UP PARAM F/U: HCPCS | Performed by: INTERNAL MEDICINE

## 2023-03-30 PROCEDURE — 3017F COLORECTAL CA SCREEN DOC REV: CPT | Performed by: INTERNAL MEDICINE

## 2023-03-30 PROCEDURE — G8484 FLU IMMUNIZE NO ADMIN: HCPCS | Performed by: INTERNAL MEDICINE

## 2023-03-30 PROCEDURE — 4004F PT TOBACCO SCREEN RCVD TLK: CPT | Performed by: INTERNAL MEDICINE

## 2023-03-30 PROCEDURE — 99215 OFFICE O/P EST HI 40 MIN: CPT | Performed by: INTERNAL MEDICINE

## 2023-03-30 PROCEDURE — G8427 DOCREV CUR MEDS BY ELIG CLIN: HCPCS | Performed by: INTERNAL MEDICINE

## 2023-03-30 RX ORDER — PREDNISONE 10 MG/1
TABLET ORAL
Qty: 15 TABLET | Refills: 0 | Status: SHIPPED | OUTPATIENT
Start: 2023-03-30 | End: 2023-04-09

## 2023-03-30 RX ORDER — LOTEPREDNOL ETABONATE 2.5 MG/ML
2 SUSPENSION/ DROPS OPHTHALMIC DAILY
COMMUNITY

## 2023-03-30 ASSESSMENT — ENCOUNTER SYMPTOMS
RESPIRATORY NEGATIVE: 1
DIARRHEA: 1
EYE REDNESS: 1
CONSTIPATION: 0
COLOR CHANGE: 1
BLOOD IN STOOL: 0
ABDOMINAL PAIN: 1

## 2023-03-30 NOTE — PROGRESS NOTES
sciatica  -- SI joints w/o sig abnormalities. L-spine with facet joint arthropathy. Some osteopphytic changes in the spine. -- patient scheduled to start water therapy. Unable to take NSAID b/c reported allergy to asa. 4. Raynaud's disease without gangrene   - intermittent blistering type lesiosn reported, + reported raynauds, and scabbed sore along the distal right 2nd toe (? Related to raynauds vs trauam) . Evaluation for SLE, vs APS? Component of tumid lupus. Discussed the importance of smoking cessation.   -     Miscellaneous Sendout; Future  -     Lupus Anticoagulant; Future  -     Urinalysis; Future  -     Protein / creatinine ratio, urine; Future  5. Cervicalgia  - sig. Formainal stenosis of cervical MRI could be contributing to the nekc paina dn the bilateral arm pain. + decreased t bicep tendon. + spurling testing.   -- on lyrica 150mg twice daily. -- prior tx: gabapentin, cymbalta    6. Class 3 severe obesity with serious comorbidity and body mass index (BMI) of 45.0 to 49.9 in adult, unspecified obesity type (HCC)  -- SI joints w/o sig abnormalities. L-spine with facet joint arthropathy. No follow-ups on file. New Prescriptions    No medications on file       3/30/2023    Please contact the office if you have any questions or change of symptoms. Patient was seen for 40 minutes with discussion of symptoms , labs and diagnosis. An additional 5 minutes was needed for documentation after the appointment. Imaging of the cervical spine was reviewed with the patient. Also reviewed previous CT abdomen pelvis results from 2016.

## 2023-04-02 LAB
APTT IMM NP PPP: NORMAL SEC (ref 32–48)
APTT P HEP NEUT PPP: NORMAL SEC (ref 32–48)
CONFIRM APTT STACLOT: NORMAL
DRVVT SCREEN TO CONFIRM RATIO: NORMAL RATIO
LA 3 SCREEN W REFLEX-IMP: NORMAL
LA NT DPL PPP QL: NORMAL
MIXING DRVVT: NORMAL SEC (ref 33–44)
PROTHROMBIN TIME: 12.4 SEC (ref 12–15.5)
REPTILASE TIME: NORMAL SEC
SCREEN APTT: 36 SEC (ref 32–48)
SCREEN DRVVT: 33 SEC (ref 33–44)
THROMBIN TIME: NORMAL SEC (ref 14.7–19.5)

## 2023-04-18 ENCOUNTER — HOSPITAL ENCOUNTER (OUTPATIENT)
Dept: MRI IMAGING | Age: 53
Discharge: HOME | End: 2023-04-18
Payer: MEDICARE

## 2023-04-18 DIAGNOSIS — G35 RELAPSING-REMITTING MULTIPLE SCLEROSIS (HCC): ICD-10-CM

## 2023-04-18 PROCEDURE — 70553 MRI BRAIN STEM W/O & W/DYE: CPT

## 2023-04-18 PROCEDURE — 6360000004 HC RX CONTRAST MEDICATION: Performed by: PHYSICIAN ASSISTANT

## 2023-04-18 PROCEDURE — A9579 GAD-BASE MR CONTRAST NOS,1ML: HCPCS | Performed by: PHYSICIAN ASSISTANT

## 2023-04-18 RX ADMIN — GADOTERIDOL 20 ML: 279.3 INJECTION, SOLUTION INTRAVENOUS at 20:06

## 2023-05-11 ENCOUNTER — OFFICE VISIT (OUTPATIENT)
Dept: RHEUMATOLOGY | Age: 53
End: 2023-05-11

## 2023-05-11 VITALS
OXYGEN SATURATION: 93 % | HEIGHT: 67 IN | WEIGHT: 293 LBS | HEART RATE: 86 BPM | DIASTOLIC BLOOD PRESSURE: 86 MMHG | BODY MASS INDEX: 45.99 KG/M2 | SYSTOLIC BLOOD PRESSURE: 136 MMHG

## 2023-05-11 DIAGNOSIS — M15.9 OSTEOARTHRITIS OF MULTIPLE JOINTS, UNSPECIFIED OSTEOARTHRITIS TYPE: ICD-10-CM

## 2023-05-11 DIAGNOSIS — G89.29 CHRONIC MIDLINE LOW BACK PAIN WITH LEFT-SIDED SCIATICA: ICD-10-CM

## 2023-05-11 DIAGNOSIS — M25.50 POLYARTHRALGIA: Primary | ICD-10-CM

## 2023-05-11 DIAGNOSIS — M54.42 CHRONIC MIDLINE LOW BACK PAIN WITH LEFT-SIDED SCIATICA: ICD-10-CM

## 2023-05-11 DIAGNOSIS — I73.00 RAYNAUD'S DISEASE WITHOUT GANGRENE: ICD-10-CM

## 2023-05-11 DIAGNOSIS — M54.2 CERVICALGIA: ICD-10-CM

## 2023-05-11 ASSESSMENT — ENCOUNTER SYMPTOMS
BACK PAIN: 1
ABDOMINAL PAIN: 0
EYE PAIN: 0
COUGH: 0
DIARRHEA: 0
SHORTNESS OF BREATH: 0
NAUSEA: 0
EYE ITCHING: 0
TROUBLE SWALLOWING: 0
CONSTIPATION: 0

## 2023-05-11 NOTE — PROGRESS NOTES
& D) 49109-338 units TABS Take by mouth Twice a Week      glimepiride (AMARYL) 2 MG tablet Take 1 tablet by mouth every morning (before breakfast)      empagliflozin (JARDIANCE) 25 MG tablet Take 1 tablet by mouth daily      baclofen (LIORESAL) 10 MG tablet Take 1 tablet by mouth 3 times daily      ezetimibe (ZETIA) 10 MG tablet Take 1 tablet by mouth daily      Teriflunomide 14 MG TABS Take 1 tablet by mouth nightly      LYRICA 150 MG capsule TAKE ONE CAPSULE BY MOUTH FOUR TIMES DAILY (Patient taking differently: Take 1 capsule by mouth 2 times daily.) 120 capsule 0    PARoxetine (PAXIL) 20 MG tablet Take 4 tablets by mouth every morning      fluticasone (FLONASE) 50 MCG/ACT nasal spray USE 2 SPRAYS IN EACH NOSTRIL EVERY DAY 1 Bottle 11    pravastatin (PRAVACHOL) 40 MG tablet Take 1 tablet by mouth daily      pantoprazole (PROTONIX) 40 MG tablet Take 1 tablet by mouth every morning (before breakfast) 90 tablet 2     No current facility-administered medications for this visit. PHYSICAL EXAMINATION / OBJECTIVE   Objective:  /86 (Site: Left Upper Arm, Position: Sitting, Cuff Size: Large Adult)   Pulse 86   Ht 5' 7.01\" (1.702 m)   Wt (!) 312 lb 9.6 oz (141.8 kg)   SpO2 93%   BMI 48.95 kg/m²     Physical Exam  Vitals reviewed. Constitutional:       Appearance: She is well-developed. Cardiovascular:      Rate and Rhythm: Normal rate and regular rhythm. Pulmonary:      Effort: Pulmonary effort is normal.      Breath sounds: Normal breath sounds. Musculoskeletal:      Cervical back: Normal range of motion and neck supple. Skin:     General: Skin is warm and dry. Findings: No rash. Comments: Cyanosis toes bilat  + fissure along right heel   Neurological:      Mental Status: She is alert and oriented to person, place, and time. Psychiatric:         Thought Content:  Thought content normal.     Upper extremities:    SHOULDERS tender bilat   ELBOWS tender bilat  WRISTS nontender,

## 2023-05-16 DIAGNOSIS — Z51.81 MEDICATION MONITORING ENCOUNTER: Primary | ICD-10-CM

## 2023-05-16 RX ORDER — SULFASALAZINE 500 MG/1
TABLET ORAL
Qty: 327 TABLET | OUTPATIENT
Start: 2023-05-16

## 2023-07-01 ENCOUNTER — NURSE ONLY (OUTPATIENT)
Dept: LAB | Age: 53
End: 2023-07-01

## 2023-07-01 DIAGNOSIS — K75.81 NASH (NONALCOHOLIC STEATOHEPATITIS): ICD-10-CM

## 2023-07-01 DIAGNOSIS — Z51.81 MEDICATION MONITORING ENCOUNTER: ICD-10-CM

## 2023-07-01 LAB
ALBUMIN SERPL BCG-MCNC: 4.1 G/DL (ref 3.5–5.1)
ALBUMIN SERPL BCG-MCNC: 4.2 G/DL (ref 3.5–5.1)
ALP SERPL-CCNC: 71 U/L (ref 38–126)
ALP SERPL-CCNC: 73 U/L (ref 38–126)
ALT SERPL W/O P-5'-P-CCNC: 14 U/L (ref 11–66)
ALT SERPL W/O P-5'-P-CCNC: 15 U/L (ref 11–66)
ANION GAP SERPL CALC-SCNC: 10 MEQ/L (ref 8–16)
AST SERPL-CCNC: 14 U/L (ref 5–40)
AST SERPL-CCNC: 15 U/L (ref 5–40)
BASOPHILS ABSOLUTE: 0.1 THOU/MM3 (ref 0–0.1)
BASOPHILS NFR BLD AUTO: 1 %
BILIRUB CONJ SERPL-MCNC: < 0.2 MG/DL (ref 0–0.3)
BILIRUB SERPL-MCNC: 0.2 MG/DL (ref 0.3–1.2)
BILIRUB SERPL-MCNC: 0.2 MG/DL (ref 0.3–1.2)
BUN SERPL-MCNC: 13 MG/DL (ref 7–22)
CALCIUM SERPL-MCNC: 9.2 MG/DL (ref 8.5–10.5)
CHLORIDE SERPL-SCNC: 105 MEQ/L (ref 98–111)
CHOLEST SERPL-MCNC: 184 MG/DL (ref 100–199)
CO2 SERPL-SCNC: 26 MEQ/L (ref 23–33)
CREAT SERPL-MCNC: 0.6 MG/DL (ref 0.4–1.2)
CRP SERPL-MCNC: 0.47 MG/DL (ref 0–1)
DEPRECATED RDW RBC AUTO: 47.9 FL (ref 35–45)
DEPRECATED RDW RBC AUTO: 47.9 FL (ref 35–45)
EOSINOPHIL NFR BLD AUTO: 2 %
EOSINOPHILS ABSOLUTE: 0.1 THOU/MM3 (ref 0–0.4)
ERYTHROCYTE [DISTWIDTH] IN BLOOD BY AUTOMATED COUNT: 14.2 % (ref 11.5–14.5)
ERYTHROCYTE [DISTWIDTH] IN BLOOD BY AUTOMATED COUNT: 14.2 % (ref 11.5–14.5)
ERYTHROCYTE [SEDIMENTATION RATE] IN BLOOD BY WESTERGREN METHOD: 10 MM/HR (ref 0–20)
FERRITIN SERPL IA-MCNC: 264 NG/ML (ref 10–291)
GFR SERPL CREATININE-BSD FRML MDRD: > 60 ML/MIN/1.73M2
GGT SERPL-CCNC: 32 U/L (ref 8–69)
GLUCOSE SERPL-MCNC: 122 MG/DL (ref 70–108)
HCT VFR BLD AUTO: 49 % (ref 37–47)
HCT VFR BLD AUTO: 49 % (ref 37–47)
HDLC SERPL-MCNC: 35 MG/DL
HGB BLD-MCNC: 14.6 GM/DL (ref 12–16)
HGB BLD-MCNC: 14.6 GM/DL (ref 12–16)
INR PPP: 0.86 (ref 0.85–1.13)
LDLC SERPL CALC-MCNC: 97 MG/DL
LYMPHOCYTES ABSOLUTE: 2.4 THOU/MM3 (ref 1–4.8)
LYMPHOCYTES NFR BLD AUTO: 36.7 %
MANUAL DIFF BLD: NORMAL
MCH RBC QN AUTO: 27.9 PG (ref 26–33)
MCH RBC QN AUTO: 27.9 PG (ref 26–33)
MCHC RBC AUTO-ENTMCNC: 29.8 GM/DL (ref 32.2–35.5)
MCHC RBC AUTO-ENTMCNC: 29.8 GM/DL (ref 32.2–35.5)
MCV RBC AUTO: 93.5 FL (ref 81–99)
MCV RBC AUTO: 93.5 FL (ref 81–99)
MONOCYTES ABSOLUTE: 0.5 THOU/MM3 (ref 0.4–1.3)
MONOCYTES NFR BLD AUTO: 8.2 %
NEUTROPHILS NFR BLD AUTO: 52.1 %
NRBC BLD AUTO-RTO: 0 /100 WBC
PLATELET # BLD AUTO: 155 THOU/MM3 (ref 130–400)
PLATELET # BLD AUTO: 155 THOU/MM3 (ref 130–400)
PLATELET BLD QL SMEAR: ADEQUATE
PMV BLD AUTO: 12.1 FL (ref 9.4–12.4)
PMV BLD AUTO: 12.1 FL (ref 9.4–12.4)
POTASSIUM SERPL-SCNC: 4.2 MEQ/L (ref 3.5–5.2)
PROT SERPL-MCNC: 6.6 G/DL (ref 6.1–8)
PROT SERPL-MCNC: 7 G/DL (ref 6.1–8)
RBC # BLD AUTO: 5.24 MILL/MM3 (ref 4.2–5.4)
RBC # BLD AUTO: 5.24 MILL/MM3 (ref 4.2–5.4)
SEGMENTED NEUTROPHILS ABSOLUTE COUNT: 3.4 THOU/MM3 (ref 1.8–7.7)
SODIUM SERPL-SCNC: 141 MEQ/L (ref 135–145)
TRIGL SERPL-MCNC: 261 MG/DL (ref 0–199)
WBC # BLD AUTO: 6.6 THOU/MM3 (ref 4.8–10.8)
WBC # BLD AUTO: 6.6 THOU/MM3 (ref 4.8–10.8)

## 2023-07-03 RX ORDER — SULFASALAZINE 500 MG/1
TABLET ORAL
Qty: 360 TABLET | OUTPATIENT
Start: 2023-07-03

## 2023-07-03 RX ORDER — SULFASALAZINE 500 MG/1
1000 TABLET ORAL 2 TIMES DAILY
Qty: 120 TABLET | Refills: 0 | Status: SHIPPED | OUTPATIENT
Start: 2023-07-03

## 2023-07-06 ENCOUNTER — OFFICE VISIT (OUTPATIENT)
Dept: RHEUMATOLOGY | Age: 53
End: 2023-07-06
Payer: MEDICARE

## 2023-07-06 VITALS
OXYGEN SATURATION: 93 % | HEIGHT: 67 IN | DIASTOLIC BLOOD PRESSURE: 80 MMHG | HEART RATE: 92 BPM | BODY MASS INDEX: 45.99 KG/M2 | SYSTOLIC BLOOD PRESSURE: 128 MMHG | WEIGHT: 293 LBS

## 2023-07-06 DIAGNOSIS — G89.29 CHRONIC MIDLINE LOW BACK PAIN WITH LEFT-SIDED SCIATICA: ICD-10-CM

## 2023-07-06 DIAGNOSIS — M54.2 CERVICALGIA: ICD-10-CM

## 2023-07-06 DIAGNOSIS — Z51.81 MEDICATION MONITORING ENCOUNTER: ICD-10-CM

## 2023-07-06 DIAGNOSIS — M54.42 CHRONIC MIDLINE LOW BACK PAIN WITH LEFT-SIDED SCIATICA: ICD-10-CM

## 2023-07-06 DIAGNOSIS — M15.9 OSTEOARTHRITIS OF MULTIPLE JOINTS, UNSPECIFIED OSTEOARTHRITIS TYPE: ICD-10-CM

## 2023-07-06 DIAGNOSIS — I73.00 RAYNAUD'S DISEASE WITHOUT GANGRENE: ICD-10-CM

## 2023-07-06 DIAGNOSIS — M06.4 UNDIFFERENTIATED INFLAMMATORY ARTHRITIS (HCC): Primary | ICD-10-CM

## 2023-07-06 PROCEDURE — 99214 OFFICE O/P EST MOD 30 MIN: CPT | Performed by: NURSE PRACTITIONER

## 2023-07-06 PROCEDURE — G8427 DOCREV CUR MEDS BY ELIG CLIN: HCPCS | Performed by: NURSE PRACTITIONER

## 2023-07-06 PROCEDURE — 4004F PT TOBACCO SCREEN RCVD TLK: CPT | Performed by: NURSE PRACTITIONER

## 2023-07-06 PROCEDURE — 3017F COLORECTAL CA SCREEN DOC REV: CPT | Performed by: NURSE PRACTITIONER

## 2023-07-06 PROCEDURE — G8417 CALC BMI ABV UP PARAM F/U: HCPCS | Performed by: NURSE PRACTITIONER

## 2023-07-06 ASSESSMENT — ENCOUNTER SYMPTOMS
SHORTNESS OF BREATH: 0
EYE PAIN: 0
EYE ITCHING: 0
DIARRHEA: 0
BACK PAIN: 1
NAUSEA: 0
COUGH: 0
TROUBLE SWALLOWING: 0
ABDOMINAL PAIN: 0
CONSTIPATION: 0

## 2023-07-12 ENCOUNTER — HOSPITAL ENCOUNTER (OUTPATIENT)
Dept: ULTRASOUND IMAGING | Age: 53
Discharge: HOME | End: 2023-07-12
Payer: MEDICARE

## 2023-07-12 DIAGNOSIS — K75.81 NASH (NONALCOHOLIC STEATOHEPATITIS): ICD-10-CM

## 2023-07-12 PROCEDURE — 76705 ECHO EXAM OF ABDOMEN: CPT

## 2023-08-03 ENCOUNTER — NURSE ONLY (OUTPATIENT)
Dept: LAB | Age: 53
End: 2023-08-03

## 2023-08-03 DIAGNOSIS — Z51.81 MEDICATION MONITORING ENCOUNTER: ICD-10-CM

## 2023-08-03 LAB
ALBUMIN SERPL BCG-MCNC: 4.3 G/DL (ref 3.5–5.1)
ALP SERPL-CCNC: 79 U/L (ref 38–126)
ALT SERPL W/O P-5'-P-CCNC: 17 U/L (ref 11–66)
ANION GAP SERPL CALC-SCNC: 12 MEQ/L (ref 8–16)
AST SERPL-CCNC: 14 U/L (ref 5–40)
BASOPHILS ABSOLUTE: 0.1 THOU/MM3 (ref 0–0.1)
BASOPHILS NFR BLD AUTO: 0.7 %
BILIRUB SERPL-MCNC: 0.2 MG/DL (ref 0.3–1.2)
BUN SERPL-MCNC: 14 MG/DL (ref 7–22)
CALCIUM SERPL-MCNC: 9.5 MG/DL (ref 8.5–10.5)
CHLORIDE SERPL-SCNC: 106 MEQ/L (ref 98–111)
CO2 SERPL-SCNC: 24 MEQ/L (ref 23–33)
CREAT SERPL-MCNC: 0.6 MG/DL (ref 0.4–1.2)
CRP SERPL-MCNC: 0.39 MG/DL (ref 0–1)
DEPRECATED RDW RBC AUTO: 47.2 FL (ref 35–45)
EOSINOPHIL NFR BLD AUTO: 1.9 %
EOSINOPHILS ABSOLUTE: 0.1 THOU/MM3 (ref 0–0.4)
ERYTHROCYTE [DISTWIDTH] IN BLOOD BY AUTOMATED COUNT: 14.1 % (ref 11.5–14.5)
ERYTHROCYTE [SEDIMENTATION RATE] IN BLOOD BY WESTERGREN METHOD: 3 MM/HR (ref 0–20)
GFR SERPL CREATININE-BSD FRML MDRD: > 60 ML/MIN/1.73M2
GLUCOSE SERPL-MCNC: 112 MG/DL (ref 70–108)
HCT VFR BLD AUTO: 48.2 % (ref 37–47)
HGB BLD-MCNC: 14.8 GM/DL (ref 12–16)
IMM GRANULOCYTES # BLD AUTO: 0.02 THOU/MM3 (ref 0–0.07)
IMM GRANULOCYTES NFR BLD AUTO: 0.3 %
LYMPHOCYTES ABSOLUTE: 2.2 THOU/MM3 (ref 1–4.8)
LYMPHOCYTES NFR BLD AUTO: 30 %
MCH RBC QN AUTO: 28.4 PG (ref 26–33)
MCHC RBC AUTO-ENTMCNC: 30.7 GM/DL (ref 32.2–35.5)
MCV RBC AUTO: 92.5 FL (ref 81–99)
MONOCYTES ABSOLUTE: 0.7 THOU/MM3 (ref 0.4–1.3)
MONOCYTES NFR BLD AUTO: 10.2 %
NEUTROPHILS NFR BLD AUTO: 56.9 %
NRBC BLD AUTO-RTO: 0 /100 WBC
PLATELET # BLD AUTO: 161 THOU/MM3 (ref 130–400)
PMV BLD AUTO: 12.2 FL (ref 9.4–12.4)
POTASSIUM SERPL-SCNC: 4 MEQ/L (ref 3.5–5.2)
PROT SERPL-MCNC: 7.1 G/DL (ref 6.1–8)
RBC # BLD AUTO: 5.21 MILL/MM3 (ref 4.2–5.4)
SEGMENTED NEUTROPHILS ABSOLUTE COUNT: 4.1 THOU/MM3 (ref 1.8–7.7)
SODIUM SERPL-SCNC: 142 MEQ/L (ref 135–145)
WBC # BLD AUTO: 7.2 THOU/MM3 (ref 4.8–10.8)

## 2023-08-04 RX ORDER — SULFASALAZINE 500 MG/1
1000 TABLET ORAL 2 TIMES DAILY
Qty: 120 TABLET | Refills: 0 | Status: SHIPPED | OUTPATIENT
Start: 2023-08-04

## 2023-08-04 RX ORDER — SULFASALAZINE 500 MG/1
TABLET ORAL
Qty: 360 TABLET | OUTPATIENT
Start: 2023-08-04

## 2023-09-07 ENCOUNTER — NURSE ONLY (OUTPATIENT)
Dept: LAB | Age: 53
End: 2023-09-07

## 2023-09-07 DIAGNOSIS — R79.89 ELEVATED FERRITIN LEVEL: ICD-10-CM

## 2023-09-07 DIAGNOSIS — Z51.81 MEDICATION MONITORING ENCOUNTER: ICD-10-CM

## 2023-09-07 DIAGNOSIS — K76.0 NAFLD (NONALCOHOLIC FATTY LIVER DISEASE): ICD-10-CM

## 2023-09-07 DIAGNOSIS — R79.89 ELEVATED LFTS: ICD-10-CM

## 2023-09-07 DIAGNOSIS — R74.8 ELEVATED SERUM GGT LEVEL: ICD-10-CM

## 2023-09-07 DIAGNOSIS — K75.81 NASH (NONALCOHOLIC STEATOHEPATITIS): ICD-10-CM

## 2023-09-07 LAB
ALBUMIN SERPL BCG-MCNC: 4.4 G/DL (ref 3.5–5.1)
ALP SERPL-CCNC: 76 U/L (ref 38–126)
ALT SERPL W/O P-5'-P-CCNC: 20 U/L (ref 11–66)
ANION GAP SERPL CALC-SCNC: 14 MEQ/L (ref 8–16)
AST SERPL-CCNC: 18 U/L (ref 5–40)
BASOPHILS ABSOLUTE: 0.1 THOU/MM3 (ref 0–0.1)
BASOPHILS NFR BLD AUTO: 0.9 %
BILIRUB SERPL-MCNC: 0.3 MG/DL (ref 0.3–1.2)
BUN SERPL-MCNC: 13 MG/DL (ref 7–22)
CALCIUM SERPL-MCNC: 9.5 MG/DL (ref 8.5–10.5)
CHLORIDE SERPL-SCNC: 105 MEQ/L (ref 98–111)
CO2 SERPL-SCNC: 24 MEQ/L (ref 23–33)
CREAT SERPL-MCNC: 0.8 MG/DL (ref 0.4–1.2)
CRP SERPL-MCNC: 0.31 MG/DL (ref 0–1)
DEPRECATED RDW RBC AUTO: 48.7 FL (ref 35–45)
EOSINOPHIL NFR BLD AUTO: 0 %
EOSINOPHILS ABSOLUTE: 0 THOU/MM3 (ref 0–0.4)
ERYTHROCYTE [DISTWIDTH] IN BLOOD BY AUTOMATED COUNT: 14 % (ref 11.5–14.5)
ERYTHROCYTE [SEDIMENTATION RATE] IN BLOOD BY WESTERGREN METHOD: 4 MM/HR (ref 0–20)
FERRITIN SERPL IA-MCNC: 325 NG/ML (ref 10–291)
GFR SERPL CREATININE-BSD FRML MDRD: > 60 ML/MIN/1.73M2
GGT SERPL-CCNC: 50 U/L (ref 8–69)
GLUCOSE SERPL-MCNC: 140 MG/DL (ref 70–108)
HCT VFR BLD AUTO: 49.5 % (ref 37–47)
HGB BLD-MCNC: 15.3 GM/DL (ref 12–16)
IMM GRANULOCYTES # BLD AUTO: 0.02 THOU/MM3 (ref 0–0.07)
IMM GRANULOCYTES NFR BLD AUTO: 0.2 %
INR PPP: 0.83 (ref 0.85–1.13)
LYMPHOCYTES ABSOLUTE: 2.5 THOU/MM3 (ref 1–4.8)
LYMPHOCYTES NFR BLD AUTO: 30.7 %
MCH RBC QN AUTO: 29.4 PG (ref 26–33)
MCHC RBC AUTO-ENTMCNC: 30.9 GM/DL (ref 32.2–35.5)
MCV RBC AUTO: 95.2 FL (ref 81–99)
MONOCYTES ABSOLUTE: 0.7 THOU/MM3 (ref 0.4–1.3)
MONOCYTES NFR BLD AUTO: 8.4 %
NEUTROPHILS NFR BLD AUTO: 59.8 %
NRBC BLD AUTO-RTO: 0 /100 WBC
PLATELET # BLD AUTO: 161 THOU/MM3 (ref 130–400)
PMV BLD AUTO: 12.1 FL (ref 9.4–12.4)
POTASSIUM SERPL-SCNC: 4.2 MEQ/L (ref 3.5–5.2)
PROT SERPL-MCNC: 6.9 G/DL (ref 6.1–8)
RBC # BLD AUTO: 5.2 MILL/MM3 (ref 4.2–5.4)
SEGMENTED NEUTROPHILS ABSOLUTE COUNT: 4.8 THOU/MM3 (ref 1.8–7.7)
SODIUM SERPL-SCNC: 143 MEQ/L (ref 135–145)
WBC # BLD AUTO: 8 THOU/MM3 (ref 4.8–10.8)

## 2023-09-07 RX ORDER — SULFASALAZINE 500 MG/1
TABLET ORAL
Qty: 360 TABLET | OUTPATIENT
Start: 2023-09-07

## 2023-09-07 RX ORDER — SULFASALAZINE 500 MG/1
1000 TABLET ORAL 2 TIMES DAILY
Qty: 120 TABLET | Refills: 0 | Status: SHIPPED | OUTPATIENT
Start: 2023-09-07

## 2023-09-12 ENCOUNTER — OFFICE VISIT (OUTPATIENT)
Dept: RHEUMATOLOGY | Age: 53
End: 2023-09-12
Payer: MEDICARE

## 2023-09-12 VITALS
WEIGHT: 293 LBS | HEART RATE: 90 BPM | SYSTOLIC BLOOD PRESSURE: 134 MMHG | DIASTOLIC BLOOD PRESSURE: 82 MMHG | OXYGEN SATURATION: 95 % | BODY MASS INDEX: 45.99 KG/M2 | HEIGHT: 67 IN

## 2023-09-12 DIAGNOSIS — M06.4 UNDIFFERENTIATED INFLAMMATORY ARTHRITIS (HCC): Primary | ICD-10-CM

## 2023-09-12 DIAGNOSIS — G89.29 CHRONIC LEFT SHOULDER PAIN: ICD-10-CM

## 2023-09-12 DIAGNOSIS — M15.9 OSTEOARTHRITIS OF MULTIPLE JOINTS, UNSPECIFIED OSTEOARTHRITIS TYPE: ICD-10-CM

## 2023-09-12 DIAGNOSIS — I73.00 RAYNAUD'S DISEASE WITHOUT GANGRENE: ICD-10-CM

## 2023-09-12 DIAGNOSIS — G89.29 CHRONIC MIDLINE LOW BACK PAIN WITH LEFT-SIDED SCIATICA: ICD-10-CM

## 2023-09-12 DIAGNOSIS — M54.42 CHRONIC MIDLINE LOW BACK PAIN WITH LEFT-SIDED SCIATICA: ICD-10-CM

## 2023-09-12 DIAGNOSIS — Z51.81 MEDICATION MONITORING ENCOUNTER: ICD-10-CM

## 2023-09-12 DIAGNOSIS — M25.512 CHRONIC LEFT SHOULDER PAIN: ICD-10-CM

## 2023-09-12 PROCEDURE — G8427 DOCREV CUR MEDS BY ELIG CLIN: HCPCS | Performed by: INTERNAL MEDICINE

## 2023-09-12 PROCEDURE — 4004F PT TOBACCO SCREEN RCVD TLK: CPT | Performed by: INTERNAL MEDICINE

## 2023-09-12 PROCEDURE — 99214 OFFICE O/P EST MOD 30 MIN: CPT | Performed by: INTERNAL MEDICINE

## 2023-09-12 PROCEDURE — G8417 CALC BMI ABV UP PARAM F/U: HCPCS | Performed by: INTERNAL MEDICINE

## 2023-09-12 PROCEDURE — 3017F COLORECTAL CA SCREEN DOC REV: CPT | Performed by: INTERNAL MEDICINE

## 2023-09-12 RX ORDER — SULFASALAZINE 500 MG/1
1500 TABLET ORAL 2 TIMES DAILY
Qty: 180 TABLET | Refills: 0 | Status: SHIPPED | OUTPATIENT
Start: 2023-09-12

## 2023-09-12 ASSESSMENT — ENCOUNTER SYMPTOMS
NAUSEA: 0
EYE ITCHING: 0
SHORTNESS OF BREATH: 0
CONSTIPATION: 0
DIARRHEA: 0
BACK PAIN: 1
ABDOMINAL PAIN: 0
TROUBLE SWALLOWING: 0
COUGH: 0
EYE PAIN: 0

## 2023-09-12 NOTE — PROGRESS NOTES
[facility] FOLLOW UP NOTE       Date Of Service: 9/12/2023  Provider: Lauren Mchugh DO    Name: eBlem Verdin   MRN: [id number]    Chief Complaint(s)     Chief Complaint   Patient presents with    Follow-up     4 mo f/u, Polyarthralgia    Pt stated pain 6/10 - Lt shoulder, Thumbs, Feet        History of Present Illness (HPI)     Belem Verdin  is a(n)53 y.o. female with a hx of cancer, chronic pain syndrome, depression, double vision, dysphagia, esophageal stricture, CAD, GERD, hearing loss, hematochezia, hemorrhoid, hidradentitis suppurativa, hyperlipidemia, lupus, sleep apnea, morbid obesity, T2DM w/ peripheral neuropathy, fatty liver, multiple sclerosis, spinal stenosis cervical spine, neuropathy, polycystic ovary disease, raynauds, sinus infection, tobacco dependence, osteoarthritis here for the f/u evaluation of inflammatory arthritis    Interval hx:   recurrence rash along the top of the Right foot and left posterior neck, left chest wall- previously diagnosed as shingles - last occurrence around 2 weeks ago - improved with valtrex. Arthralgia - bilateral hands (, elbows, shoulder, lower back, and bilateral feet. Pain up to 5.5/10 over the past week. Worsened pain in the left shoulder and bilateral thumbs after falls around 2-3 weeks ago when right leg was numb related to multiple sclerosis symptoms. Worsened swelling of the left shoulder, blc thumbs over the past 2-3 weeks. Aggravating factors: weather changes. Back: walking. Alleviating factors: ice packs, tyleno. Painful and limited ROM of the left shoulder. + AM stiffness lasting ~ 1 hour. Ongoing radicular pain down the left leg. Raynauds active bilateral feet - improved since starting Sulfasalazine. No longer having cracking of the heals. REVIEW OF SYSTEMS: (ROS)    Review of Systems   Constitutional:  Negative for fatigue, fever and unexpected weight change. HENT:  Negative for congestion and trouble swallowing.

## 2023-09-22 DIAGNOSIS — M06.4 UNDIFFERENTIATED INFLAMMATORY ARTHRITIS (HCC): ICD-10-CM

## 2023-09-22 RX ORDER — SULFASALAZINE 500 MG/1
TABLET ORAL
Qty: 540 TABLET | OUTPATIENT
Start: 2023-09-22

## 2023-10-03 ENCOUNTER — HOSPITAL ENCOUNTER (OUTPATIENT)
Dept: GENERAL RADIOLOGY | Age: 53
Discharge: HOME | End: 2023-10-03
Attending: INTERNAL MEDICINE
Payer: MEDICARE

## 2023-10-03 ENCOUNTER — NURSE ONLY (OUTPATIENT)
Dept: LAB | Age: 53
End: 2023-10-03

## 2023-10-03 ENCOUNTER — HOSPITAL ENCOUNTER (OUTPATIENT)
Age: 53
Discharge: HOME | End: 2023-10-03
Attending: INTERNAL MEDICINE
Payer: MEDICARE

## 2023-10-03 ENCOUNTER — HOSPITAL ENCOUNTER (OUTPATIENT)
Dept: MRI IMAGING | Age: 53
Discharge: HOME | End: 2023-10-03
Attending: INTERNAL MEDICINE
Payer: MEDICARE

## 2023-10-03 DIAGNOSIS — M54.42 CHRONIC MIDLINE LOW BACK PAIN WITH LEFT-SIDED SCIATICA: ICD-10-CM

## 2023-10-03 DIAGNOSIS — Z51.81 MEDICATION MONITORING ENCOUNTER: ICD-10-CM

## 2023-10-03 DIAGNOSIS — G89.29 CHRONIC LEFT SHOULDER PAIN: ICD-10-CM

## 2023-10-03 DIAGNOSIS — M06.4 UNDIFFERENTIATED INFLAMMATORY ARTHRITIS (HCC): ICD-10-CM

## 2023-10-03 DIAGNOSIS — G89.29 CHRONIC MIDLINE LOW BACK PAIN WITH LEFT-SIDED SCIATICA: ICD-10-CM

## 2023-10-03 DIAGNOSIS — M25.512 CHRONIC LEFT SHOULDER PAIN: ICD-10-CM

## 2023-10-03 LAB
ALBUMIN SERPL BCG-MCNC: 4.3 G/DL (ref 3.5–5.1)
ALP SERPL-CCNC: 77 U/L (ref 38–126)
ALT SERPL W/O P-5'-P-CCNC: 22 U/L (ref 11–66)
ANION GAP SERPL CALC-SCNC: 12 MEQ/L (ref 8–16)
AST SERPL-CCNC: 18 U/L (ref 5–40)
BASOPHILS ABSOLUTE: 0.1 THOU/MM3 (ref 0–0.1)
BASOPHILS NFR BLD AUTO: 0.7 %
BILIRUB SERPL-MCNC: 0.3 MG/DL (ref 0.3–1.2)
BUN SERPL-MCNC: 12 MG/DL (ref 7–22)
CALCIUM SERPL-MCNC: 9.8 MG/DL (ref 8.5–10.5)
CHLORIDE SERPL-SCNC: 106 MEQ/L (ref 98–111)
CO2 SERPL-SCNC: 25 MEQ/L (ref 23–33)
CREAT SERPL-MCNC: 0.7 MG/DL (ref 0.4–1.2)
CRP SERPL-MCNC: 0.46 MG/DL (ref 0–1)
DEPRECATED MEAN GLUCOSE BLD GHB EST-ACNC: 105 MG/DL (ref 70–126)
DEPRECATED RDW RBC AUTO: 49.6 FL (ref 35–45)
EOSINOPHIL NFR BLD AUTO: 0 %
EOSINOPHILS ABSOLUTE: 0 THOU/MM3 (ref 0–0.4)
ERYTHROCYTE [DISTWIDTH] IN BLOOD BY AUTOMATED COUNT: 14.3 % (ref 11.5–14.5)
ERYTHROCYTE [SEDIMENTATION RATE] IN BLOOD BY WESTERGREN METHOD: 4 MM/HR (ref 0–20)
GFR SERPL CREATININE-BSD FRML MDRD: > 60 ML/MIN/1.73M2
GLUCOSE SERPL-MCNC: 113 MG/DL (ref 70–108)
HBA1C MFR BLD HPLC: 5.5 % (ref 4.4–6.4)
HCT VFR BLD AUTO: 48.5 % (ref 37–47)
HGB BLD-MCNC: 15.1 GM/DL (ref 12–16)
IGG SERPL-MCNC: 813 MG/DL (ref 700–1600)
IMM GRANULOCYTES # BLD AUTO: 0.04 THOU/MM3 (ref 0–0.07)
IMM GRANULOCYTES NFR BLD AUTO: 0.5 %
LYMPHOCYTES ABSOLUTE: 2.7 THOU/MM3 (ref 1–4.8)
LYMPHOCYTES NFR BLD AUTO: 29.9 %
MCH RBC QN AUTO: 29.5 PG (ref 26–33)
MCHC RBC AUTO-ENTMCNC: 31.1 GM/DL (ref 32.2–35.5)
MCV RBC AUTO: 94.9 FL (ref 81–99)
MONOCYTES ABSOLUTE: 0.7 THOU/MM3 (ref 0.4–1.3)
MONOCYTES NFR BLD AUTO: 7.6 %
NEUTROPHILS NFR BLD AUTO: 61.3 %
NRBC BLD AUTO-RTO: 0 /100 WBC
PLATELET # BLD AUTO: 174 THOU/MM3 (ref 130–400)
PMV BLD AUTO: 12.2 FL (ref 9.4–12.4)
POTASSIUM SERPL-SCNC: 4.2 MEQ/L (ref 3.5–5.2)
PROT SERPL-MCNC: 7.1 G/DL (ref 6.1–8)
RBC # BLD AUTO: 5.11 MILL/MM3 (ref 4.2–5.4)
SEGMENTED NEUTROPHILS ABSOLUTE COUNT: 5.5 THOU/MM3 (ref 1.8–7.7)
SODIUM SERPL-SCNC: 143 MEQ/L (ref 135–145)
WBC # BLD AUTO: 8.9 THOU/MM3 (ref 4.8–10.8)

## 2023-10-03 PROCEDURE — 73030 X-RAY EXAM OF SHOULDER: CPT

## 2023-10-03 PROCEDURE — 72148 MRI LUMBAR SPINE W/O DYE: CPT

## 2023-10-03 RX ORDER — SULFASALAZINE 500 MG/1
1500 TABLET ORAL 2 TIMES DAILY
Qty: 180 TABLET | Refills: 0 | Status: SHIPPED | OUTPATIENT
Start: 2023-10-03

## 2023-10-04 ENCOUNTER — TELEPHONE (OUTPATIENT)
Dept: RHEUMATOLOGY | Age: 53
End: 2023-10-04

## 2023-10-04 DIAGNOSIS — M48.062 SPINAL STENOSIS OF LUMBAR REGION WITH NEUROGENIC CLAUDICATION: ICD-10-CM

## 2023-10-04 DIAGNOSIS — M54.40 CHRONIC MIDLINE LOW BACK PAIN WITH SCIATICA, SCIATICA LATERALITY UNSPECIFIED: Primary | ICD-10-CM

## 2023-10-04 DIAGNOSIS — G89.29 CHRONIC MIDLINE LOW BACK PAIN WITH SCIATICA, SCIATICA LATERALITY UNSPECIFIED: Primary | ICD-10-CM

## 2023-10-04 RX ORDER — SULFASALAZINE 500 MG/1
TABLET ORAL
Qty: 540 TABLET | OUTPATIENT
Start: 2023-10-04

## 2023-10-04 NOTE — TELEPHONE ENCOUNTER
Diagnosis Orders   1. Chronic midline low back pain with sciatica, sciatica laterality unspecified  External Referral To Physical Therapy      2.  Spinal stenosis of lumbar region with neurogenic claudication  External Referral To Physical Therapy        - declined referral to pain management , and referral for surgical evaluation  - attempting to loose weight

## 2023-10-05 ENCOUNTER — TELEPHONE (OUTPATIENT)
Dept: RHEUMATOLOGY | Age: 53
End: 2023-10-05

## 2023-10-05 DIAGNOSIS — G89.29 CHRONIC MIDLINE LOW BACK PAIN WITH SCIATICA, SCIATICA LATERALITY UNSPECIFIED: Primary | ICD-10-CM

## 2023-10-05 DIAGNOSIS — G89.29 CHRONIC LEFT SHOULDER PAIN: ICD-10-CM

## 2023-10-05 DIAGNOSIS — M54.40 CHRONIC MIDLINE LOW BACK PAIN WITH SCIATICA, SCIATICA LATERALITY UNSPECIFIED: Primary | ICD-10-CM

## 2023-10-05 DIAGNOSIS — M25.512 CHRONIC LEFT SHOULDER PAIN: ICD-10-CM

## 2023-10-05 NOTE — TELEPHONE ENCOUNTER
Diagnosis Orders   1. Chronic midline low back pain with sciatica, sciatica laterality unspecified        2.  Chronic left shoulder pain  External Referral To Occupational Therapy

## 2023-10-05 NOTE — TELEPHONE ENCOUNTER
Called patient to ask where to send her referral to P.T. and stated [facility] but she had already received a call and was scheduled. She forgot to ask about possible O.T. for her shoulder? Please advise. Thank You!

## 2023-10-10 ENCOUNTER — APPOINTMENT (OUTPATIENT)
Dept: MRI IMAGING | Age: 53
End: 2023-10-10
Payer: MEDICARE

## 2023-10-10 ENCOUNTER — APPOINTMENT (OUTPATIENT)
Dept: GENERAL RADIOLOGY | Age: 53
End: 2023-10-10
Payer: MEDICARE

## 2023-10-10 ENCOUNTER — HOSPITAL ENCOUNTER (EMERGENCY)
Age: 53
Discharge: HOME | End: 2023-10-10
Attending: STUDENT IN AN ORGANIZED HEALTH CARE EDUCATION/TRAINING PROGRAM
Payer: MEDICARE

## 2023-10-10 ENCOUNTER — APPOINTMENT (OUTPATIENT)
Dept: CT IMAGING | Age: 53
End: 2023-10-10
Payer: MEDICARE

## 2023-10-10 VITALS
SYSTOLIC BLOOD PRESSURE: 103 MMHG | HEART RATE: 70 BPM | TEMPERATURE: 97.8 F | OXYGEN SATURATION: 96 % | BODY MASS INDEX: 47.77 KG/M2 | RESPIRATION RATE: 18 BRPM | WEIGHT: 293 LBS | DIASTOLIC BLOOD PRESSURE: 75 MMHG

## 2023-10-10 DIAGNOSIS — G35 MULTIPLE SCLEROSIS (HCC): Primary | ICD-10-CM

## 2023-10-10 DIAGNOSIS — H53.9 VISUAL DISTURBANCE: ICD-10-CM

## 2023-10-10 LAB
ALBUMIN SERPL BCG-MCNC: 4.3 G/DL (ref 3.5–5.1)
ALP SERPL-CCNC: 79 U/L (ref 38–126)
ALT SERPL W/O P-5'-P-CCNC: 16 U/L (ref 11–66)
ANION GAP SERPL CALC-SCNC: 13 MEQ/L (ref 8–16)
APTT PPP: 31.1 SECONDS (ref 22–38)
AST SERPL-CCNC: 16 U/L (ref 5–40)
BASE EXCESS BLDA CALC-SCNC: 1.2 MMOL/L (ref -2–3)
BASOPHILS ABSOLUTE: 0.1 THOU/MM3 (ref 0–0.1)
BASOPHILS NFR BLD AUTO: 0.8 %
BILIRUB SERPL-MCNC: 0.4 MG/DL (ref 0.3–1.2)
BUN SERPL-MCNC: 14 MG/DL (ref 7–22)
CALCIUM SERPL-MCNC: 9.7 MG/DL (ref 8.5–10.5)
CHLORIDE SERPL-SCNC: 103 MEQ/L (ref 98–111)
CO2 SERPL-SCNC: 24 MEQ/L (ref 23–33)
COLLECTED BY:: NORMAL
CREAT SERPL-MCNC: 0.6 MG/DL (ref 0.4–1.2)
CRP SERPL-MCNC: 0.44 MG/DL (ref 0–1)
DEPRECATED RDW RBC AUTO: 47.8 FL (ref 35–45)
EOSINOPHIL NFR BLD AUTO: 0 %
EOSINOPHILS ABSOLUTE: 0 THOU/MM3 (ref 0–0.4)
ERYTHROCYTE [DISTWIDTH] IN BLOOD BY AUTOMATED COUNT: 14.3 % (ref 11.5–14.5)
ERYTHROCYTE [SEDIMENTATION RATE] IN BLOOD BY WESTERGREN METHOD: 4 MM/HR (ref 0–20)
GFR SERPL CREATININE-BSD FRML MDRD: > 60 ML/MIN/1.73M2
GLUCOSE BLD STRIP.AUTO-MCNC: 108 MG/DL (ref 70–108)
GLUCOSE SERPL-MCNC: 109 MG/DL (ref 70–108)
HCO3 BLDA-SCNC: 26 MMOL/L (ref 23–28)
HCT VFR BLD AUTO: 48.8 % (ref 37–47)
HGB BLD-MCNC: 15.3 GM/DL (ref 12–16)
IMM GRANULOCYTES # BLD AUTO: 0.04 THOU/MM3 (ref 0–0.07)
IMM GRANULOCYTES NFR BLD AUTO: 0.4 %
INR PPP: 0.89 (ref 0.85–1.13)
LACTATE SERPL-SCNC: 1.3 MMOL/L (ref 0.5–2)
LYMPHOCYTES ABSOLUTE: 2.5 THOU/MM3 (ref 1–4.8)
LYMPHOCYTES NFR BLD AUTO: 27.8 %
MCH RBC QN AUTO: 29 PG (ref 26–33)
MCHC RBC AUTO-ENTMCNC: 31.4 GM/DL (ref 32.2–35.5)
MCV RBC AUTO: 92.6 FL (ref 81–99)
MONOCYTES ABSOLUTE: 0.8 THOU/MM3 (ref 0.4–1.3)
MONOCYTES NFR BLD AUTO: 8.8 %
NEUTROPHILS NFR BLD AUTO: 62.2 %
NRBC BLD AUTO-RTO: 0 /100 WBC
OSMOLALITY SERPL CALC.SUM OF ELEC: 280.5 MOSMOL/KG (ref 275–300)
PCO2 TEMP ADJ BLDMV: 41 MMHG (ref 41–51)
PH BLDMV: 7.41 [PH] (ref 7.31–7.41)
PLATELET # BLD AUTO: 183 THOU/MM3 (ref 130–400)
PMV BLD AUTO: 12.3 FL (ref 9.4–12.4)
PO2 BLDMV: 35 MMHG (ref 25–40)
POC CREATININE WHOLE BLOOD: 0.5 MG/DL (ref 0.5–1.2)
POTASSIUM SERPL-SCNC: 4 MEQ/L (ref 3.5–5.2)
PROCALCITONIN SERPL IA-MCNC: 0.03 NG/ML (ref 0.01–0.09)
PROT SERPL-MCNC: 6.9 G/DL (ref 6.1–8)
RBC # BLD AUTO: 5.27 MILL/MM3 (ref 4.2–5.4)
SAO2 % BLDMV: 67 %
SEGMENTED NEUTROPHILS ABSOLUTE COUNT: 5.5 THOU/MM3 (ref 1.8–7.7)
SODIUM SERPL-SCNC: 140 MEQ/L (ref 135–145)
TROPONIN, HIGH SENSITIVITY: < 6 NG/L (ref 0–12)
WBC # BLD AUTO: 8.9 THOU/MM3 (ref 4.8–10.8)

## 2023-10-10 PROCEDURE — 71045 X-RAY EXAM CHEST 1 VIEW: CPT

## 2023-10-10 PROCEDURE — 93005 ELECTROCARDIOGRAM TRACING: CPT | Performed by: NURSE PRACTITIONER

## 2023-10-10 PROCEDURE — 93010 ELECTROCARDIOGRAM REPORT: CPT | Performed by: INTERNAL MEDICINE

## 2023-10-10 PROCEDURE — 87040 BLOOD CULTURE FOR BACTERIA: CPT

## 2023-10-10 PROCEDURE — 85730 THROMBOPLASTIN TIME PARTIAL: CPT

## 2023-10-10 PROCEDURE — 85025 COMPLETE CBC W/AUTO DIFF WBC: CPT

## 2023-10-10 PROCEDURE — 36415 COLL VENOUS BLD VENIPUNCTURE: CPT

## 2023-10-10 PROCEDURE — 82948 REAGENT STRIP/BLOOD GLUCOSE: CPT

## 2023-10-10 PROCEDURE — 85610 PROTHROMBIN TIME: CPT

## 2023-10-10 PROCEDURE — 6360000004 HC RX CONTRAST MEDICATION: Performed by: NURSE PRACTITIONER

## 2023-10-10 PROCEDURE — 99285 EMERGENCY DEPT VISIT HI MDM: CPT

## 2023-10-10 PROCEDURE — 70450 CT HEAD/BRAIN W/O DYE: CPT

## 2023-10-10 PROCEDURE — 82803 BLOOD GASES ANY COMBINATION: CPT

## 2023-10-10 PROCEDURE — 84484 ASSAY OF TROPONIN QUANT: CPT

## 2023-10-10 PROCEDURE — 70553 MRI BRAIN STEM W/O & W/DYE: CPT

## 2023-10-10 PROCEDURE — 83605 ASSAY OF LACTIC ACID: CPT

## 2023-10-10 PROCEDURE — 70498 CT ANGIOGRAPHY NECK: CPT

## 2023-10-10 PROCEDURE — 99223 1ST HOSP IP/OBS HIGH 75: CPT

## 2023-10-10 PROCEDURE — 70496 CT ANGIOGRAPHY HEAD: CPT

## 2023-10-10 PROCEDURE — 82565 ASSAY OF CREATININE: CPT

## 2023-10-10 PROCEDURE — A9579 GAD-BASE MR CONTRAST NOS,1ML: HCPCS | Performed by: NURSE PRACTITIONER

## 2023-10-10 PROCEDURE — 85651 RBC SED RATE NONAUTOMATED: CPT

## 2023-10-10 PROCEDURE — 80053 COMPREHEN METABOLIC PANEL: CPT

## 2023-10-10 PROCEDURE — 86140 C-REACTIVE PROTEIN: CPT

## 2023-10-10 PROCEDURE — 84145 PROCALCITONIN (PCT): CPT

## 2023-10-10 PROCEDURE — 6370000000 HC RX 637 (ALT 250 FOR IP): Performed by: NURSE PRACTITIONER

## 2023-10-10 RX ORDER — CLONAZEPAM 0.5 MG/1
1 TABLET ORAL ONCE
Status: COMPLETED | OUTPATIENT
Start: 2023-10-10 | End: 2023-10-10

## 2023-10-10 RX ADMIN — CLONAZEPAM 1 MG: 0.5 TABLET ORAL at 14:17

## 2023-10-10 RX ADMIN — IOPAMIDOL 85 ML: 755 INJECTION, SOLUTION INTRAVENOUS at 13:55

## 2023-10-10 RX ADMIN — GADOTERIDOL 20 ML: 279.3 INJECTION, SOLUTION INTRAVENOUS at 17:56

## 2023-10-10 ASSESSMENT — PAIN - FUNCTIONAL ASSESSMENT
PAIN_FUNCTIONAL_ASSESSMENT: NONE - DENIES PAIN
PAIN_FUNCTIONAL_ASSESSMENT: NONE - DENIES PAIN

## 2023-10-10 ASSESSMENT — ENCOUNTER SYMPTOMS: PHOTOPHOBIA: 0

## 2023-10-10 NOTE — ED NOTES
In for hourly rounding. Pt resting on cot in position of comfort. Pt remains A&Ox4, resps easy and unlabored. IV shows no s/s of infection or infiltration. Pt pain remains unchanged at this time. Monitor remains in place. Updated pt on POC. Will monitor.      Lyndsay Arreaga RN  10/10/23 [id number]

## 2023-10-10 NOTE — ED NOTES
In for hourly rounding. Pt resting on cot in position of comfort. Pt remains A&Ox4, resps easy and unlabored. IV shows no s/s of infection or infiltration. Pt pain remains unchanged at this time. Monitor remains in place. Updated pt on POC. Will monitor.      Estefani Lancaster RN  10/10/23 4762

## 2023-10-10 NOTE — ED NOTES
In for hourly rounding. Pt resting on cot in position of comfort. Pt remains A&Ox4, resps easy and unlabored. IV shows no s/s of infection or infiltration. Pt pain remains unchanged at this time. Pt taken to restroom via wheelchair and back at this time. Pt stand/pivot without difficulties to wheelchair. Monitor remains in place. Updated pt on POC. Will monitor.      Pam Abreu RN  10/10/23 5530

## 2023-10-10 NOTE — ED NOTES
Pt to the ED via personal  transport. Pt presents with complaints of changes in her vision and fatigue . Patient states that symptoms began . Last night at 10 pm.  EKG completed and IV access obtained. Patient is alert and oriented x 4. Noemi VILLAFUERTE at bedside to assess pt Respirations are regular and unlabored. Patient provided blanket. Call light within reach.       Andrea Kennedy RN  10/10/23 2350

## 2023-10-10 NOTE — DISCHARGE INSTRUCTIONS
Call your neurologist in the morning. Return if you note any worsening symptoms. Follow up as directed.

## 2023-10-12 LAB
EKG ATRIAL RATE: 82 BPM
EKG P AXIS: 33 DEGREES
EKG P-R INTERVAL: 182 MS
EKG Q-T INTERVAL: 344 MS
EKG QRS DURATION: 86 MS
EKG QTC CALCULATION (BAZETT): 401 MS
EKG R AXIS: -13 DEGREES
EKG T AXIS: 23 DEGREES
EKG VENTRICULAR RATE: 82 BPM

## 2023-10-15 LAB
BACTERIA BLD AEROBE CULT: NORMAL
BACTERIA BLD AEROBE CULT: NORMAL

## 2023-10-16 ENCOUNTER — PATIENT MESSAGE (OUTPATIENT)
Dept: RHEUMATOLOGY | Age: 53
End: 2023-10-16

## 2023-10-16 DIAGNOSIS — Z51.81 MEDICATION MONITORING ENCOUNTER: Primary | ICD-10-CM

## 2023-10-16 NOTE — TELEPHONE ENCOUNTER
From: Lisa Mendez Bret  To: Dr. Dye Pages: 10/16/2023 1:22 PM EDT  Subject: Mauricelawrence Andrade     You ordered PT for me and I currently have to put that on hold. I had to go to the ER Tuesday as my vision was bad, I was disoriented and my tremors were worse. They called a code stroke and did a CT and MRI. I did not have a stroke. They determined it was my MS. Saw my eye doc and she also determined it was my MS and optic neuritis. My Neuro is calling in IV Steroids, lab work and another MRI and EKG. They feel it is time to advance my MS treatments. The steroids make me extremely sick so I won't be able to do PT until I am at least through that. I will contact you once I have finished to see if new PT orders need sent. Should I continue with the Sulfasalazine while being on the steroids? Thank you.    Sondra Robertson

## 2023-10-23 ENCOUNTER — HOSPITAL ENCOUNTER (OUTPATIENT)
Dept: MRI IMAGING | Age: 53
Discharge: HOME | End: 2023-10-23
Payer: MEDICARE

## 2023-10-23 ENCOUNTER — HOSPITAL ENCOUNTER (OUTPATIENT)
Dept: NURSING | Age: 53
Discharge: HOME | End: 2023-10-23
Payer: MEDICARE

## 2023-10-23 VITALS
DIASTOLIC BLOOD PRESSURE: 55 MMHG | HEART RATE: 84 BPM | SYSTOLIC BLOOD PRESSURE: 96 MMHG | TEMPERATURE: 97 F | OXYGEN SATURATION: 94 % | RESPIRATION RATE: 18 BRPM

## 2023-10-23 DIAGNOSIS — G35 MULTIPLE SCLEROSIS (HCC): ICD-10-CM

## 2023-10-23 DIAGNOSIS — H46.9 OPTIC NEURITIS: ICD-10-CM

## 2023-10-23 PROCEDURE — 6360000002 HC RX W HCPCS

## 2023-10-23 PROCEDURE — 6360000004 HC RX CONTRAST MEDICATION: Performed by: PSYCHIATRY & NEUROLOGY

## 2023-10-23 PROCEDURE — A9579 GAD-BASE MR CONTRAST NOS,1ML: HCPCS | Performed by: PSYCHIATRY & NEUROLOGY

## 2023-10-23 PROCEDURE — 96365 THER/PROPH/DIAG IV INF INIT: CPT

## 2023-10-23 PROCEDURE — 70543 MRI ORBT/FAC/NCK W/O &W/DYE: CPT

## 2023-10-23 PROCEDURE — 2580000003 HC RX 258

## 2023-10-23 RX ADMIN — GADOTERIDOL 20 ML: 279.3 INJECTION, SOLUTION INTRAVENOUS at 13:00

## 2023-10-23 RX ADMIN — SODIUM CHLORIDE 1000 MG: 9 INJECTION, SOLUTION INTRAVENOUS at 13:34

## 2023-10-23 NOTE — PROGRESS NOTES
1320- Patient arrived to [facility] ambulatory with cane for Solu-medrol infusion. Patient states she has MS and has received infusion before. Vitals stable. IV inserted. Call light placed within reach. PT RIGHTS AND RESPONSIBILITIES OFFERED TO PT.     1334- Infusion started. Patient offered drink and snack. Denies further needs. 1411- Infusion complete. Patient tolerated well with no issues. IV removed. Vitals obtained. BP reading a little low, but patient states she feels fine and would like to leave. IV left in for next 2 treatments per order. 1415- Discharge instructions reviewed with patient. Verbalized understanding with no further questions. AVS provided. Discharged ambulatory with cane to lobby in stable condition.              __M__ Safety:       (Environmental)  [place] to environment  Ensure ID band is correct and in place/ allergy band as needed  Assess for fall risk  Initiate fall precautions as applicable (fall band, side rails, etc.)  Call light within reach  Bed in low position/ wheels locked    __M__ Pain:       Assess pain level and characteristics  Administer analgesics as ordered  Assess effectiveness of pain management and report to MD as needed    __M__ Knowledge Deficit:  Assess baseline knowledge  Provide teaching at level of understanding  Provide teaching via preferred learning method  Evaluate teaching effectiveness    __M__ Hemodynamic/Respiratory Status:       (Pre and Post Procedure Monitoring)  Assess/Monitor vital signs and LOC  Assess Baseline SpO2 prior to any sedation  Obtain weight/height  Assess vital signs/ LOC until patient meets discharge criteria  Monitor procedure site and notify MD of any issues

## 2023-10-24 ENCOUNTER — HOSPITAL ENCOUNTER (OUTPATIENT)
Dept: NURSING | Age: 53
Discharge: HOME | End: 2023-10-24
Payer: MEDICARE

## 2023-10-24 VITALS
TEMPERATURE: 96.2 F | DIASTOLIC BLOOD PRESSURE: 89 MMHG | HEART RATE: 107 BPM | RESPIRATION RATE: 18 BRPM | OXYGEN SATURATION: 94 % | SYSTOLIC BLOOD PRESSURE: 135 MMHG

## 2023-10-24 PROCEDURE — 2580000003 HC RX 258

## 2023-10-24 PROCEDURE — 6360000002 HC RX W HCPCS

## 2023-10-24 PROCEDURE — 96365 THER/PROPH/DIAG IV INF INIT: CPT

## 2023-10-24 RX ORDER — SODIUM CHLORIDE 0.9 % (FLUSH) 0.9 %
10 SYRINGE (ML) INJECTION PRN
Status: DISCONTINUED | OUTPATIENT
Start: 2023-10-24 | End: 2023-10-25 | Stop reason: HOSPADM

## 2023-10-24 RX ADMIN — SODIUM CHLORIDE 1000 MG: 9 INJECTION, SOLUTION INTRAVENOUS at 08:00

## 2023-10-24 RX ADMIN — SODIUM CHLORIDE, PRESERVATIVE FREE 10 ML: 5 INJECTION INTRAVENOUS at 07:59

## 2023-10-24 ASSESSMENT — PAIN - FUNCTIONAL ASSESSMENT: PAIN_FUNCTIONAL_ASSESSMENT: 0-10

## 2023-10-24 NOTE — PROGRESS NOTES
4168 Patient arrived to [facility] ambulatory for solumedrol infusion. Oriented to room and call light  PT RIGHTS AND RESPONSIBILITIES OFFERED TO PT.     4520 IV flushed and medication started. She denies complaints    7673 Medication completed and patient denies complaints. Iv removed. Discharge instructions given and explained and she denies questions.   Discharged ambulatory     _M___ Safety:       (Environmental)  [place] to environment  Ensure ID band is correct and in place/ allergy band as needed  Assess for fall risk  Initiate fall precautions as applicable (fall band, side rails, etc.)  Call light within reach  Bed in low position/ wheels locked    _M___ Pain:       Assess pain level and characteristics  Administer analgesics as ordered  Assess effectiveness of pain management and report to MD as needed    __M__ Knowledge Deficit:  Assess baseline knowledge  Provide teaching at level of understanding  Provide teaching via preferred learning method  Evaluate teaching effectiveness    _M___ Hemodynamic/Respiratory Status:       (Pre and Post Procedure Monitoring)  Assess/Monitor vital signs and LOC  Assess Baseline SpO2 prior to any sedation  Obtain weight/height  Assess vital signs/ LOC until patient meets discharge criteria  Monitor procedure site and notify MD of any issues

## 2023-10-25 ENCOUNTER — CLINICAL DOCUMENTATION (OUTPATIENT)
Facility: HOSPITAL | Age: 53
End: 2023-10-25

## 2023-10-25 ENCOUNTER — HOSPITAL ENCOUNTER (OUTPATIENT)
Dept: NURSING | Age: 53
Discharge: HOME | End: 2023-10-25
Payer: MEDICARE

## 2023-10-25 VITALS
TEMPERATURE: 97.7 F | SYSTOLIC BLOOD PRESSURE: 121 MMHG | OXYGEN SATURATION: 94 % | DIASTOLIC BLOOD PRESSURE: 60 MMHG | HEART RATE: 75 BPM | RESPIRATION RATE: 18 BRPM

## 2023-10-25 PROCEDURE — 2580000003 HC RX 258

## 2023-10-25 PROCEDURE — 6360000002 HC RX W HCPCS

## 2023-10-25 PROCEDURE — 96365 THER/PROPH/DIAG IV INF INIT: CPT

## 2023-10-25 RX ADMIN — SODIUM CHLORIDE 1000 MG: 9 INJECTION, SOLUTION INTRAVENOUS at 08:02

## 2023-10-25 ASSESSMENT — PAIN - FUNCTIONAL ASSESSMENT: PAIN_FUNCTIONAL_ASSESSMENT: 0-10

## 2023-10-25 NOTE — PROGRESS NOTES
_m___ Safety:       (Environmental)  [place] to environment  Ensure ID band is correct and in place/ allergy band as needed  Assess for fall risk  Initiate fall precautions as applicable (fall band, side rails, etc.)  Call light within reach  Bed in low position/ wheels locked    __m__ Pain:       Assess pain level and characteristics  Administer analgesics as ordered  Assess effectiveness of pain management and report to MD as needed    __m__ Knowledge Deficit:  Assess baseline knowledge  Provide teaching at level of understanding  Provide teaching via preferred learning method  Evaluate teaching effectiveness    __m__ Hemodynamic/Respiratory Status:       (Pre and Post Procedure Monitoring)  Assess/Monitor vital signs and LOC  Assess Baseline SpO2 prior to any sedation  Obtain weight/height  Assess vital signs/ LOC until patient meets discharge criteria  Monitor procedure site and notify MD of any issues

## 2023-10-26 NOTE — PROGRESS NOTES
Patient Assistance                   Additional notes: Reviewed chart and no assistance is needed. Patient has Medicare and Medicaid.

## 2023-10-30 ENCOUNTER — NURSE ONLY (OUTPATIENT)
Dept: LAB | Age: 53
End: 2023-10-30

## 2023-10-30 DIAGNOSIS — R79.89 ELEVATED FERRITIN LEVEL: ICD-10-CM

## 2023-10-30 DIAGNOSIS — K75.81 NASH (NONALCOHOLIC STEATOHEPATITIS): ICD-10-CM

## 2023-10-30 DIAGNOSIS — Z51.81 MEDICATION MONITORING ENCOUNTER: ICD-10-CM

## 2023-10-30 LAB
ALBUMIN SERPL BCG-MCNC: 4.2 G/DL (ref 3.5–5.1)
ALP SERPL-CCNC: 82 U/L (ref 38–126)
ALT SERPL W/O P-5'-P-CCNC: 11 U/L (ref 11–66)
ANION GAP SERPL CALC-SCNC: 13 MEQ/L (ref 8–16)
AST SERPL-CCNC: 11 U/L (ref 5–40)
BASOPHILS ABSOLUTE: 0.1 THOU/MM3 (ref 0–0.1)
BASOPHILS NFR BLD AUTO: 0.7 %
BILIRUB SERPL-MCNC: 0.3 MG/DL (ref 0.3–1.2)
BUN SERPL-MCNC: 15 MG/DL (ref 7–22)
CALCIUM SERPL-MCNC: 9.1 MG/DL (ref 8.5–10.5)
CHLORIDE SERPL-SCNC: 102 MEQ/L (ref 98–111)
CO2 SERPL-SCNC: 24 MEQ/L (ref 23–33)
CREAT SERPL-MCNC: 0.7 MG/DL (ref 0.4–1.2)
CRP SERPL-MCNC: 0.53 MG/DL (ref 0–1)
DEPRECATED RDW RBC AUTO: 49.2 FL (ref 35–45)
EOSINOPHIL NFR BLD AUTO: 0 %
EOSINOPHILS ABSOLUTE: 0 THOU/MM3 (ref 0–0.4)
ERYTHROCYTE [DISTWIDTH] IN BLOOD BY AUTOMATED COUNT: 14.4 % (ref 11.5–14.5)
ERYTHROCYTE [SEDIMENTATION RATE] IN BLOOD BY WESTERGREN METHOD: 1 MM/HR (ref 0–20)
FERRITIN SERPL IA-MCNC: 457 NG/ML (ref 10–291)
GFR SERPL CREATININE-BSD FRML MDRD: > 60 ML/MIN/1.73M2
GLUCOSE SERPL-MCNC: 129 MG/DL (ref 70–108)
HCT VFR BLD AUTO: 49 % (ref 37–47)
HGB BLD-MCNC: 15.1 GM/DL (ref 12–16)
IMM GRANULOCYTES # BLD AUTO: 0.12 THOU/MM3 (ref 0–0.07)
IMM GRANULOCYTES NFR BLD AUTO: 1.4 %
INR PPP: 0.83 (ref 0.85–1.13)
LYMPHOCYTES ABSOLUTE: 2.7 THOU/MM3 (ref 1–4.8)
LYMPHOCYTES NFR BLD AUTO: 31.7 %
MCH RBC QN AUTO: 28.8 PG (ref 26–33)
MCHC RBC AUTO-ENTMCNC: 30.8 GM/DL (ref 32.2–35.5)
MCV RBC AUTO: 93.3 FL (ref 81–99)
MONOCYTES ABSOLUTE: 0.7 THOU/MM3 (ref 0.4–1.3)
MONOCYTES NFR BLD AUTO: 7.8 %
NEUTROPHILS NFR BLD AUTO: 58.4 %
NRBC BLD AUTO-RTO: 0 /100 WBC
PLATELET # BLD AUTO: 168 THOU/MM3 (ref 130–400)
PMV BLD AUTO: 11.9 FL (ref 9.4–12.4)
POTASSIUM SERPL-SCNC: 4.4 MEQ/L (ref 3.5–5.2)
PROT SERPL-MCNC: 7.1 G/DL (ref 6.1–8)
RBC # BLD AUTO: 5.25 MILL/MM3 (ref 4.2–5.4)
SEGMENTED NEUTROPHILS ABSOLUTE COUNT: 5 THOU/MM3 (ref 1.8–7.7)
SODIUM SERPL-SCNC: 139 MEQ/L (ref 135–145)
WBC # BLD AUTO: 8.6 THOU/MM3 (ref 4.8–10.8)

## 2023-10-31 ENCOUNTER — PATIENT MESSAGE (OUTPATIENT)
Dept: RHEUMATOLOGY | Age: 53
End: 2023-10-31

## 2023-10-31 DIAGNOSIS — M06.4 UNDIFFERENTIATED INFLAMMATORY ARTHRITIS (HCC): ICD-10-CM

## 2023-10-31 RX ORDER — SULFASALAZINE 500 MG/1
1500 TABLET ORAL 2 TIMES DAILY
Qty: 180 TABLET | Refills: 0 | Status: SHIPPED | OUTPATIENT
Start: 2023-10-31

## 2023-10-31 RX ORDER — SULFASALAZINE 500 MG/1
TABLET ORAL
Qty: 540 TABLET | OUTPATIENT
Start: 2023-10-31

## 2023-10-31 NOTE — TELEPHONE ENCOUNTER
Pt called. She reports - active optic neuritis - with blurred vision and eye pain. - feeling generalized weakness, feverish w/o fever, body aches (genearlized). - 10/25/2023 - solumedrol injection infusion @ 1000 mg- scheduled for neurology evaluation in 1 week. -- discussed the uncertainty of the cervical adenopathy - discussed the possbility of reactive adenopathy but as she is not having any overt features of an infection at this time would avoid additional testing. We discuss that there is concern that her current symptoms are related to her MS based upon the timing and active flare of the optic neuritis.

## 2023-10-31 NOTE — TELEPHONE ENCOUNTER
From: Parvez Guy Bret  To: Justin Harrisoning  Sent: 10/31/2023 1:40 PM EDT  Subject: Chen Plater     Dr Alan Bell had reviewed recent blood work and commented:  \"Labs are stable compared to prior evaluations. There continues to be an elevated ferritin level which can reflect active systemic inflammation. Please have your labs repeated as scheduled\"    So he didn't see anything concerning except for inflammation in my bloodwork? I am trying to figure out why I am feeling so incredibly bad, if it is the optic neuritis/MS or if something else is going on. I do know on the CT scan I had in the ER two weeks ago it noted seeing \"Small cervical lymph nodes present bilaterally\"? I was just wondering if that with anything my bloodwork is showing could mean possibly an infection or anything. I do have an appointment with my Neurologist next week as well. Thank you for any input you may have!

## 2023-12-21 DIAGNOSIS — M06.4 UNDIFFERENTIATED INFLAMMATORY ARTHRITIS (HCC): ICD-10-CM

## 2023-12-22 RX ORDER — SULFASALAZINE 500 MG/1
TABLET ORAL
Qty: 540 TABLET | OUTPATIENT
Start: 2023-12-22

## 2024-01-30 ENCOUNTER — OFFICE VISIT (OUTPATIENT)
Dept: RHEUMATOLOGY | Age: 54
End: 2024-01-30
Payer: MEDICARE

## 2024-01-30 VITALS
HEIGHT: 67 IN | OXYGEN SATURATION: 94 % | WEIGHT: 293 LBS | BODY MASS INDEX: 45.99 KG/M2 | DIASTOLIC BLOOD PRESSURE: 80 MMHG | SYSTOLIC BLOOD PRESSURE: 124 MMHG | HEART RATE: 81 BPM

## 2024-01-30 DIAGNOSIS — I73.00 RAYNAUD'S DISEASE WITHOUT GANGRENE: ICD-10-CM

## 2024-01-30 DIAGNOSIS — G89.29 CHRONIC MIDLINE LOW BACK PAIN WITH SCIATICA, SCIATICA LATERALITY UNSPECIFIED: ICD-10-CM

## 2024-01-30 DIAGNOSIS — M48.062 SPINAL STENOSIS OF LUMBAR REGION WITH NEUROGENIC CLAUDICATION: ICD-10-CM

## 2024-01-30 DIAGNOSIS — M06.4 UNDIFFERENTIATED INFLAMMATORY ARTHRITIS (HCC): Primary | ICD-10-CM

## 2024-01-30 DIAGNOSIS — M54.2 CERVICALGIA: ICD-10-CM

## 2024-01-30 DIAGNOSIS — M15.9 OSTEOARTHRITIS OF MULTIPLE JOINTS, UNSPECIFIED OSTEOARTHRITIS TYPE: ICD-10-CM

## 2024-01-30 DIAGNOSIS — Z51.81 MEDICATION MONITORING ENCOUNTER: ICD-10-CM

## 2024-01-30 DIAGNOSIS — G89.29 CHRONIC LEFT SHOULDER PAIN: ICD-10-CM

## 2024-01-30 DIAGNOSIS — M54.40 CHRONIC MIDLINE LOW BACK PAIN WITH SCIATICA, SCIATICA LATERALITY UNSPECIFIED: ICD-10-CM

## 2024-01-30 DIAGNOSIS — M25.512 CHRONIC LEFT SHOULDER PAIN: ICD-10-CM

## 2024-01-30 PROCEDURE — 99214 OFFICE O/P EST MOD 30 MIN: CPT | Performed by: NURSE PRACTITIONER

## 2024-01-30 PROCEDURE — 3017F COLORECTAL CA SCREEN DOC REV: CPT | Performed by: NURSE PRACTITIONER

## 2024-01-30 PROCEDURE — 4004F PT TOBACCO SCREEN RCVD TLK: CPT | Performed by: NURSE PRACTITIONER

## 2024-01-30 PROCEDURE — G8484 FLU IMMUNIZE NO ADMIN: HCPCS | Performed by: NURSE PRACTITIONER

## 2024-01-30 PROCEDURE — G8417 CALC BMI ABV UP PARAM F/U: HCPCS | Performed by: NURSE PRACTITIONER

## 2024-01-30 PROCEDURE — G8427 DOCREV CUR MEDS BY ELIG CLIN: HCPCS | Performed by: NURSE PRACTITIONER

## 2024-01-30 ASSESSMENT — ENCOUNTER SYMPTOMS
EYE PAIN: 0
COUGH: 0
CONSTIPATION: 0
DIARRHEA: 0
BACK PAIN: 1
TROUBLE SWALLOWING: 0
ABDOMINAL PAIN: 0
SHORTNESS OF BREATH: 0
NAUSEA: 0
EYE ITCHING: 0

## 2024-01-30 NOTE — PROGRESS NOTES
nausea.   Endocrine: Negative for cold intolerance and heat intolerance.   Genitourinary:  Negative for difficulty urinating, frequency and urgency.   Musculoskeletal:  Positive for arthralgias, back pain and neck pain. Negative for joint swelling.   Skin:  Negative for rash.   Neurological:  Positive for numbness (feet). Negative for dizziness, weakness and headaches.   Psychiatric/Behavioral:  The patient is not nervous/anxious.        PAST MEDICAL HISTORY      Past Medical History:   Diagnosis Date    Anal pruritus     Cancer (HCC)     cervical    Chronic pain syndrome     Depression     Double vision     Dry eye syndrome     Dysphagia     Esophageal stricture     FH: CAD (coronary artery disease)     GERD (gastroesophageal reflux disease)     Hearing loss     Hematochezia     Hemorrhoid     Hidradenitis suppurativa     Hyperlipidemia     Loss of vision     Lupus (HCC)     Mild sleep apnea     Morbid obesity with BMI of 50.0-59.9, adult (HCC)     Multiple sclerosis (HCC)     Neuropathy     Numbness and tingling     arms and legs    Optic neuritis     Polycystic ovary disease     Raynaud disease     Shingles     Sinus infection     Tattoo     Tobacco dependence     Unspecified sleep apnea     Urinary incontinence        PAST SURGICAL HISTORY      Past Surgical History:   Procedure Laterality Date    ABDOMEN SURGERY      lipoma    BREAST SURGERY       SECTION      COLONOSCOPY      DILATION AND CURETTAGE OF UTERUS      ENDOMETRIAL ABLATION      HEMORRHOID SURGERY      HYSTERECTOMY (CERVIX STATUS UNKNOWN)      partial    LAPAROSCOPY      TUBAL LIGATION      UPPER GASTROINTESTINAL ENDOSCOPY   w/ Dilation, w/ Dilation       FAMILY HISTORY      Family History   Problem Relation Age of Onset    Heart Disease Mother     Diabetes Mother     Cancer Mother         cervical    Stroke Mother     Colon Polyps Mother     Esophageal Cancer Father 74    Heart Attack Father     Other Sister         Hashimoto's

## 2024-02-12 DIAGNOSIS — M06.4 UNDIFFERENTIATED INFLAMMATORY ARTHRITIS (HCC): ICD-10-CM

## 2024-02-12 RX ORDER — SULFASALAZINE 500 MG/1
TABLET ORAL
Qty: 180 TABLET | Refills: 0 | OUTPATIENT
Start: 2024-02-12

## 2024-02-22 ENCOUNTER — OFFICE VISIT (OUTPATIENT)
Dept: PHYSICAL MEDICINE AND REHAB | Age: 54
End: 2024-02-22
Payer: MEDICARE

## 2024-02-22 VITALS — WEIGHT: 293 LBS | BODY MASS INDEX: 50.26 KG/M2

## 2024-02-22 DIAGNOSIS — M47.816 FACET HYPERTROPHY OF LUMBAR REGION: ICD-10-CM

## 2024-02-22 DIAGNOSIS — M79.18 MYOFASCIAL PAIN: ICD-10-CM

## 2024-02-22 DIAGNOSIS — G35 MULTIPLE SCLEROSIS, RELAPSING-REMITTING (HCC): Chronic | ICD-10-CM

## 2024-02-22 DIAGNOSIS — M79.2 NEUROPATHIC PAIN: ICD-10-CM

## 2024-02-22 DIAGNOSIS — M53.3 SI (SACROILIAC) JOINT DYSFUNCTION: ICD-10-CM

## 2024-02-22 DIAGNOSIS — M54.16 LUMBAR RADICULOPATHY: Primary | ICD-10-CM

## 2024-02-22 DIAGNOSIS — G89.4 CHRONIC PAIN SYNDROME: ICD-10-CM

## 2024-02-22 DIAGNOSIS — M51.36 BULGING LUMBAR DISC: ICD-10-CM

## 2024-02-22 DIAGNOSIS — M43.16 ANTEROLISTHESIS OF LUMBAR SPINE: ICD-10-CM

## 2024-02-22 DIAGNOSIS — M53.3 SACROILIAC JOINT PAIN: ICD-10-CM

## 2024-02-22 DIAGNOSIS — M47.816 LUMBAR SPONDYLOSIS: ICD-10-CM

## 2024-02-22 DIAGNOSIS — M48.061 SPINAL STENOSIS OF LUMBAR REGION WITHOUT NEUROGENIC CLAUDICATION: ICD-10-CM

## 2024-02-22 PROCEDURE — G8427 DOCREV CUR MEDS BY ELIG CLIN: HCPCS | Performed by: NURSE PRACTITIONER

## 2024-02-22 PROCEDURE — 4004F PT TOBACCO SCREEN RCVD TLK: CPT | Performed by: NURSE PRACTITIONER

## 2024-02-22 PROCEDURE — 99205 OFFICE O/P NEW HI 60 MIN: CPT | Performed by: NURSE PRACTITIONER

## 2024-02-22 PROCEDURE — 3017F COLORECTAL CA SCREEN DOC REV: CPT | Performed by: NURSE PRACTITIONER

## 2024-02-22 PROCEDURE — G8484 FLU IMMUNIZE NO ADMIN: HCPCS | Performed by: NURSE PRACTITIONER

## 2024-02-22 PROCEDURE — G8417 CALC BMI ABV UP PARAM F/U: HCPCS | Performed by: NURSE PRACTITIONER

## 2024-02-22 RX ORDER — OFATUMUMAB 20 MG/.4ML
INJECTION, SOLUTION SUBCUTANEOUS
COMMUNITY
Start: 2024-01-11

## 2024-02-22 NOTE — PROGRESS NOTES
Functionality Assessment/Goals Worksheet     On a scale of 0 (Does not Interfere) to 10 (Completely Interferes)     1.  Which number describes how during the past week pain has interfered with           the following:  A.  General Activity:  10  B.  Mood: 10  C.  Walking Ability:  10  D.  Normal Work (Includes both work outside the home and housework):  10  E.  Relations with Other People:   10  F.  Sleep:   10  G.  Enjoyment of Life:   10    2.  Patient Prefers to Take their Pain Medications:     [x]  On a regular basis   []  Only when necessary    []  Does not take pain medications    3.  What are the Patient's Goals/Expectations for Visiting Pain Management?     [x]  Learn about my pain    [x]  Receive Medication   [x]  Physical Therapy     [x]  Treat Depression   [x]  Receive Injections    [x]  Treat Sleep   [x]  Deal with Anxiety and Stress   []  Treat Opoid Dependence/Addiction   []  Other:                                
7/2022     CLINICAL INFORMATION: Polyarthralgia, Osteoarthritis of multiple joints, unspecified osteoarthritis type, Chronic midline low back pain with left-sided sciatica, Chronic midline low back pain with left-sided sciatica     COMPARISON: None     TECHNIQUE: 2 views of the lumbar spine        FINDINGS:      5 mm anterolisthesis of L4 on L5. 3 mm retrolisthesis of L2 on L3.     Marked facet arthrosis at L4 and L5.     Overall, mild lumbar spondylosis.     Tiny calcific densities seen overlying the renal shadow.     Phleboliths are seen in the pelvis.     No acute fracture or dislocation.     Lumbar lordosis is maintained..     5 lumbar vertebrae are seen.              IMPRESSION:  1. Mild lumbar spondylosis.  2. No acute bony abnormality.  3. 5 mm anterolisthesis of L4 on L5.  4. 3 mm retrolisthesis of L2 on L3.  5. Tiny renal calculi.    PROCEDURE: MRI CERVICAL SPINE W WO CONTRAST 9/2021     CLINICAL INFORMATION: Relapsing remitting multiple sclerosis (HCC) . Neck and back pain with worsening numbness on the left side.     COMPARISON: MRI cervical spine dated 9/19/2018     TECHNIQUE: Sagittal T1, T2 and STIR sequences were obtained through the cervical spine. Axial fast and echo and gradient echo T2-weighted images were obtained.  Postcontrast axial and sagittal T1-weighted images were obtained. 20 mL ProHance was injected   in the right AC.     FINDINGS:  There is anatomic vertebral body height and alignment. There is a focal area of hyperintense T1 and T2 signal in the C7 vertebral body is evidence for a hemangioma. Marrow signal is otherwise within normal limits. The craniocervical junction appears   intact. No focal area of abnormal T2 signal is identified within the cervical spinal cord. There is no abnormal enhancement of the cervical spinal cord. Paraspinal soft tissues are unremarkable.  At C2-3 there is no significant spinal canal or neuroforaminal stenosis.  At C3-4 there is no significant spinal

## 2024-02-22 NOTE — PATIENT INSTRUCTIONS
Learning About Lumbar Epidural Steroid Injections  What is a lumbar epidural steroid injection?     A lumbar epidural injection is a shot into the epidural space--the area in your back around the spinal cord. The shot may help reduce pain, tingling, or numbness in your back, buttock, or leg. The shot may have a steroid to reduce pain and swelling and a local anesthetic to numb nerves.  How is a lumbar epidural steroid injection done?  The doctor may use an imaging test before or during your injection. This can be an MRI, a CT scan, or an X-ray. These tests can show where your nerve problems are.  After finding the right spot, the doctor may inject a numbing medicine into the skin where you will get the steroid injection. Then he or she puts the needle for the steroid into the numbed area. You may feel some pressure. You could feel some stinging or burning during the injection.  How long does an epidural steroid injection take?  It takes about 10 to 15 minutes to get this injection. You will probably go home about 20 to 30 minutes after you get it.  What can you expect after a lumbar epidural steroid injection?  If your injection had local anesthetic and a steroid, your legs may feel heavy or numb right after. You will probably be able to walk. But you may need to be extra careful. Take care not to lose your balance, and be sure to follow your doctor's instructions.  If your injection contained local anesthetic, you may feel better right away. But this pain relief will last only a few hours. Your pain will probably return. This is because the steroids have not started working yet. Before the steroids start to work, your back may be sore for a few days.  These injections don't always work. When they do, it takes 1 to 5 days. This pain relief can last for several days to a few months or longer.  You may want to do less than normal for a few days. But you may also be able to return to your daily routine.  Some people

## 2024-03-13 ENCOUNTER — TELEPHONE (OUTPATIENT)
Dept: PHYSICAL MEDICINE AND REHAB | Age: 54
End: 2024-03-13

## 2024-03-13 NOTE — TELEPHONE ENCOUNTER
Pt called and wants to cancel procedure and f/u. She will call at a later date once she's able to get her medicaid figured out.

## 2024-04-05 ENCOUNTER — PATIENT MESSAGE (OUTPATIENT)
Dept: RHEUMATOLOGY | Age: 54
End: 2024-04-05

## 2024-04-05 DIAGNOSIS — M06.4 UNDIFFERENTIATED INFLAMMATORY ARTHRITIS (HCC): ICD-10-CM

## 2024-04-08 DIAGNOSIS — M06.4 UNDIFFERENTIATED INFLAMMATORY ARTHRITIS (HCC): ICD-10-CM

## 2024-04-08 RX ORDER — SULFASALAZINE 500 MG/1
1500 TABLET ORAL 2 TIMES DAILY
Qty: 180 TABLET | Refills: 0 | Status: SHIPPED | OUTPATIENT
Start: 2024-04-08

## 2024-04-08 RX ORDER — SULFASALAZINE 500 MG/1
TABLET ORAL
Qty: 540 TABLET | OUTPATIENT
Start: 2024-04-08

## 2024-04-08 NOTE — TELEPHONE ENCOUNTER
DOLV: 1/30/24  DONV: 6/3/24  LAST LAB DRAW: 4/5/24  LAST TB TEST: 10/25/23    Lab Results   Component Value Date     04/05/2024    K 3.9 04/05/2024     04/05/2024    CO2 25 04/05/2024    BUN 13 04/05/2024    CREATININE 0.7 04/05/2024    GLUCOSE 128 (H) 04/05/2024    CALCIUM 9.1 04/05/2024    PROT 7.3 04/05/2024    LABALBU 4.4 04/05/2024    BILITOT 0.3 04/05/2024    ALKPHOS 76 04/05/2024    AST 20 04/05/2024    ALT 22 04/05/2024    LABGLOM >90 04/05/2024    AGRATIO 1.5 03/28/2023       Recent Labs     04/05/24  0727   WBC 8.7   HGB 15.5   HCT 48.9*   MCV 91.4          Lab Results   Component Value Date    SEDRATE 61 (H) 12/20/2023       Lab Results   Component Value Date    CRP 0.48 12/20/2023

## 2024-04-08 NOTE — TELEPHONE ENCOUNTER
From: Josy Queen  To: Jacquelyn Fernandes  Sent: 4/5/2024 12:54 PM EDT  Subject: Refill/labs     Hi!    I did my blood work for my sulfazalazine this morning. Just need a refill script sent in when you receive the results please!    I also have standing orders through my Neuro for tests related to my new MS meds. I asked that those results also be sent to you as they monitor immunoglobulins and stuff I don't understand LOL. I didn't know if any of the info would be useful to you for my care!     Thank you!!  Josy Queen

## 2024-04-29 NOTE — DISCHARGE INSTRUCTIONS
Post procedure Instructions:    No driving or making significant decisions for the remainder of the day.   Be cautions with walking and activity for 24 hours, do not over exert yourself.  Ok to resume pre-procedure activity level today.  Apply ice to site of injection site if you have pain or discomfort.  Do not submerge sit of injection during bath or pool activities for 48 hours post-procedure.  Resume blood thinning medications in 24 hours.     Call office [phone] if you have:  Temperature greater than 100.4  Persistent nausea and vomiting  Severe uncontrolled pain  Redness, tenderness, or signs of infection (pain, swelling, redness, odor or green/yellow discharge around the site)  Difficulty breathing, headache or visual disturbances  Hives  Persistent dizziness or light-headedness  Extreme fatigue  Any other questions or concerns you may have after discharge    In an emergency, call 911 or go to an Emergency Department at a nearby hospital    “Surgical Site Infections”      How can we work together to prevent Surgical Site Infections?   We would like to thank you for choosing [facility] for your Surgical Care.  Below you will find helpful information on how we can work together to prevent Surgical Site Infections.    What is a Surgical Site Infection (SSI)?  A surgical site infection is an infection that occurs after surgery in the part of the body where the surgery took place. Most patients who have surgery do not develop an infection. However, infections develop in about 1 to 3 out of every 100 patients who have surgery.  Some of the common symptoms of a surgical site infection are:  Redness and pain around the area where you had surgery  Drainage of cloudy fluid from your surgical wound  Fever    Can SSIs be treated?  Yes. Most surgical site infections can be treated with antibiotics. The antibiotic given to you depends on the bacteria (germs) causing the infection. Sometimes patients with

## 2024-04-30 NOTE — H&P
lumbar MRI, cervical MRI and results were discussed with patient today.     Analgesics:   The patient is currently managing their pain with the use of Lyrica which is prescribed by neurology.     We recommend that the patient follow the recommendation of the prescribing provider with regard to the above medication(s) and contact their prescribing provider for refills if needed.   The side effect profile of long-term opioid use was discussed and recommended emphasis on multimodal strategies for pain management.     Patient is taking Acetaminophen. Patient informed that the maximum amount of acetaminophen taken on a regular basis should only be 4000 mg per day.     Adjuvants:    OARRS reviewed, pain contract agreement signed    Interventions:     In presence of lumbosacral radiating pain, the option of lumbar epidural steroid injection approach at lumbar 5- sacral, under fluoroscopy was chosen. The risks and benefits were discussed in detail with the patient. Patient wants to proceed with the injection with Dr Ching    Procedure educations:  Discussed with patient about risks with procedure including infection, reaction to medication, increased pain, failure of procedures, worsening of symptoms, or bleeding.    Anticoagulation/NPO Recommendations:   Patient does not need to hold any medications prior to the procedure.  Patient will need to be NPO x 8 hours prior to the procedure.  We will start an IV prior to the procedure    We will utilize moderate conscious intravenous sedation due to patient's extreme needle phobia and procedural anxiety. Without IV anesthesia patient is at increased risk of procedural and post-procedural complications including tissue damage, severe pain, and potential nerve damage due to inability to lay still. These will likely increase the possibility of patient having a worse outcome after the procedure and/or a failed response to injection therapy. Patient's medical history has been

## 2024-05-01 ENCOUNTER — APPOINTMENT (OUTPATIENT)
Dept: GENERAL RADIOLOGY | Age: 54
End: 2024-05-01
Attending: ANESTHESIOLOGY
Payer: MEDICARE

## 2024-05-01 ENCOUNTER — HOSPITAL ENCOUNTER (OUTPATIENT)
Age: 54
Setting detail: OUTPATIENT SURGERY
Discharge: HOME | End: 2024-05-01
Attending: ANESTHESIOLOGY | Admitting: ANESTHESIOLOGY
Payer: MEDICARE

## 2024-05-01 VITALS
TEMPERATURE: 97.3 F | HEART RATE: 75 BPM | HEIGHT: 66 IN | DIASTOLIC BLOOD PRESSURE: 74 MMHG | SYSTOLIC BLOOD PRESSURE: 126 MMHG | OXYGEN SATURATION: 93 % | RESPIRATION RATE: 16 BRPM | WEIGHT: 293 LBS | BODY MASS INDEX: 47.09 KG/M2

## 2024-05-01 LAB — GLUCOSE BLD STRIP.AUTO-MCNC: 122 MG/DL (ref 70–108)

## 2024-05-01 PROCEDURE — 3600000054 HC PAIN LEVEL 3 BASE: Performed by: ANESTHESIOLOGY

## 2024-05-01 PROCEDURE — 6360000004 HC RX CONTRAST MEDICATION: Performed by: ANESTHESIOLOGY

## 2024-05-01 PROCEDURE — 6360000002 HC RX W HCPCS: Performed by: ANESTHESIOLOGY

## 2024-05-01 PROCEDURE — 2500000003 HC RX 250 WO HCPCS: Performed by: ANESTHESIOLOGY

## 2024-05-01 PROCEDURE — 2709999900 HC NON-CHARGEABLE SUPPLY: Performed by: ANESTHESIOLOGY

## 2024-05-01 PROCEDURE — 99152 MOD SED SAME PHYS/QHP 5/>YRS: CPT | Performed by: ANESTHESIOLOGY

## 2024-05-01 PROCEDURE — 62323 NJX INTERLAMINAR LMBR/SAC: CPT | Performed by: ANESTHESIOLOGY

## 2024-05-01 PROCEDURE — 7100000011 HC PHASE II RECOVERY - ADDTL 15 MIN: Performed by: ANESTHESIOLOGY

## 2024-05-01 PROCEDURE — 82948 REAGENT STRIP/BLOOD GLUCOSE: CPT

## 2024-05-01 PROCEDURE — 7100000010 HC PHASE II RECOVERY - FIRST 15 MIN: Performed by: ANESTHESIOLOGY

## 2024-05-01 RX ORDER — DEXAMETHASONE SODIUM PHOSPHATE 4 MG/ML
INJECTION, SOLUTION INTRA-ARTICULAR; INTRALESIONAL; INTRAMUSCULAR; INTRAVENOUS; SOFT TISSUE PRN
Status: DISCONTINUED | OUTPATIENT
Start: 2024-05-01 | End: 2024-05-01 | Stop reason: ALTCHOICE

## 2024-05-01 RX ORDER — ACYCLOVIR 200 MG/1
CAPSULE ORAL PRN
Status: DISCONTINUED | OUTPATIENT
Start: 2024-05-01 | End: 2024-05-01 | Stop reason: ALTCHOICE

## 2024-05-01 RX ORDER — FENTANYL CITRATE 50 UG/ML
INJECTION, SOLUTION INTRAMUSCULAR; INTRAVENOUS PRN
Status: DISCONTINUED | OUTPATIENT
Start: 2024-05-01 | End: 2024-05-01 | Stop reason: ALTCHOICE

## 2024-05-01 RX ORDER — MIDAZOLAM HYDROCHLORIDE 1 MG/ML
INJECTION INTRAMUSCULAR; INTRAVENOUS PRN
Status: DISCONTINUED | OUTPATIENT
Start: 2024-05-01 | End: 2024-05-01 | Stop reason: ALTCHOICE

## 2024-05-01 ASSESSMENT — PAIN - FUNCTIONAL ASSESSMENT
PAIN_FUNCTIONAL_ASSESSMENT: NONE - DENIES PAIN
PAIN_FUNCTIONAL_ASSESSMENT: 0-10

## 2024-05-01 NOTE — POST SEDATION
[facility]  Sedation/Analgesia Post Sedation Record    Pt Name: Josy Queen  MRN: [id number]  YOB: 1970  Procedure Performed By: Benson Ching DO  Primary Care Physician: Jw Braga MD    POST-PROCEDURE    Physicians/Assistants: Benson Ching DO  Procedure Performed: See Procedure Note   Sedation/Anesthesia: Versed and Fentanyl (See procedure note for amount and duration)  Estimated Blood Loss:     0  ml  Specimens Removed: None        Complications: None           Benson Ching DO  Electronically signed 5/1/2024 at 6:55 PM

## 2024-05-01 NOTE — PRE SEDATION
[facility]  Pre-Sedation/Analgesia History & Physical    Pt Name: Josy Queen  MRN: [id number]  YOB: 1970  Provider Performing Procedure: Benson Ching DO   Primary Care Physician: Jw Braga MD      MEDICAL HISTORY       has a past medical history of Anal pruritus, Cancer (HCC), Chronic pain syndrome, Depression, Double vision, Dry eye syndrome, Dysphagia, Esophageal stricture, FH: CAD (coronary artery disease), GERD (gastroesophageal reflux disease), Hearing loss, Hematochezia, Hemorrhoid, Hidradenitis suppurativa, Hyperlipidemia, Loss of vision, Lupus (HCC), Mild sleep apnea, Morbid obesity with BMI of 50.0-59.9, adult (HCC), Multiple sclerosis (HCC), Neuropathy, Numbness and tingling, Optic neuritis, Polycystic ovary disease, Raynaud disease, Shingles, Sinus infection, Tattoo, Tobacco dependence, Unspecified sleep apnea, and Urinary incontinence.  SURGICAL HISTORY   has a past surgical history that includes Hysterectomy;  section; Tubal ligation; Dilation and curettage of uterus; laparoscopy; Hemorrhoid surgery; Upper gastrointestinal endoscopy ( w/ Dilation, w/ Dilation); Colonoscopy (); Breast surgery; Endometrial ablation; Abdomen surgery; and Pain management procedure (N/A, 2024).    ALLERGIES   Allergies as of 2024 - Fully Reviewed 2024   Allergen Reaction Noted    Ampyra [dalfampridine]  2018    Benadryl [diphenhydramine] Other (See Comments) 2013    Celecoxib  2022    Celexa [citalopram]  10/11/2021    Cortisone  10/15/2012    Cymbalta [duloxetine hcl]  10/11/2021    Duloxetine  2021    Solu-medrol [methylprednisolone]  2016    Aspirin Palpitations 2012       MEDICATIONS   Prior to Admission medications    Medication Sig Start Date End Date Taking? Authorizing Provider   sulfaSALAzine (AZULFIDINE) 500 MG tablet Take 3 tablets by mouth 2 times daily 24   Jacquelyn Fernandes, APRN - CNP

## 2024-05-01 NOTE — PROGRESS NOTES
0920 Patient arrives to recovery room via cart.  Spontaneous respirations, vss, report received from surgical RN.  Patient denies pain, numbness, tingling , nausea.  Injection site clean, dry, intact. HOB elevated. IV capped. Snack and drink given.  Call light within reach.    0940 IV discontinued. Up to dress self  0957 Discharge to home in stable ambulatory condition with [employer]

## 2024-05-03 DIAGNOSIS — M06.4 UNDIFFERENTIATED INFLAMMATORY ARTHRITIS (HCC): ICD-10-CM

## 2024-05-03 RX ORDER — SULFASALAZINE 500 MG/1
1500 TABLET ORAL 2 TIMES DAILY
Qty: 180 TABLET | Refills: 1 | Status: SHIPPED | OUTPATIENT
Start: 2024-05-03

## 2024-05-03 NOTE — TELEPHONE ENCOUNTER
DOLV: 1/30/24  DONV: 6/3/24  LAST LAB DRAW: 4/5/24 (last CRP/SED 12/20/23)  LAST TB TEST: 10/25/23    Lab Results   Component Value Date     04/05/2024    K 3.9 04/05/2024     04/05/2024    CO2 25 04/05/2024    BUN 13 04/05/2024    CREATININE 0.7 04/05/2024    GLUCOSE 128 (H) 04/05/2024    CALCIUM 9.1 04/05/2024    BILITOT 0.3 04/05/2024    ALKPHOS 76 04/05/2024    AST 20 04/05/2024    ALT 22 04/05/2024    LABGLOM >90 04/05/2024    AGRATIO 1.5 03/28/2023       Recent Labs     04/05/24  0727   WBC 8.7   HGB 15.5   HCT 48.9*   MCV 91.4          Lab Results   Component Value Date    SEDRATE 61 (H) 12/20/2023       Lab Results   Component Value Date    CRP 0.48 12/20/2023

## 2024-05-23 ENCOUNTER — HOSPITAL ENCOUNTER (OUTPATIENT)
Dept: GENERAL RADIOLOGY | Age: 54
Discharge: HOME | End: 2024-05-23
Payer: MEDICARE

## 2024-05-23 ENCOUNTER — HOSPITAL ENCOUNTER (OUTPATIENT)
Age: 54
Discharge: HOME | End: 2024-05-23
Payer: MEDICARE

## 2024-05-23 DIAGNOSIS — M47.816 LUMBAR SPONDYLOSIS: ICD-10-CM

## 2024-05-23 PROCEDURE — 72120 X-RAY BEND ONLY L-S SPINE: CPT

## 2024-05-29 ENCOUNTER — OFFICE VISIT (OUTPATIENT)
Dept: PHYSICAL MEDICINE AND REHAB | Age: 54
End: 2024-05-29
Payer: MEDICARE

## 2024-05-29 VITALS
HEIGHT: 66 IN | WEIGHT: 293 LBS | BODY MASS INDEX: 47.09 KG/M2 | DIASTOLIC BLOOD PRESSURE: 80 MMHG | SYSTOLIC BLOOD PRESSURE: 136 MMHG

## 2024-05-29 DIAGNOSIS — M79.2 NEUROPATHIC PAIN: ICD-10-CM

## 2024-05-29 DIAGNOSIS — G35 MULTIPLE SCLEROSIS, RELAPSING-REMITTING (HCC): ICD-10-CM

## 2024-05-29 DIAGNOSIS — M53.3 SACROILIAC JOINT PAIN: ICD-10-CM

## 2024-05-29 DIAGNOSIS — M43.16 ANTEROLISTHESIS OF LUMBAR SPINE: ICD-10-CM

## 2024-05-29 DIAGNOSIS — M51.36 BULGING LUMBAR DISC: ICD-10-CM

## 2024-05-29 DIAGNOSIS — M54.16 LUMBAR RADICULOPATHY: ICD-10-CM

## 2024-05-29 DIAGNOSIS — M53.3 SI (SACROILIAC) JOINT DYSFUNCTION: ICD-10-CM

## 2024-05-29 DIAGNOSIS — M54.12 CERVICAL RADICULITIS: ICD-10-CM

## 2024-05-29 DIAGNOSIS — M47.816 FACET HYPERTROPHY OF LUMBAR REGION: ICD-10-CM

## 2024-05-29 DIAGNOSIS — M79.18 MYOFASCIAL PAIN: ICD-10-CM

## 2024-05-29 DIAGNOSIS — M47.816 LUMBAR SPONDYLOSIS: ICD-10-CM

## 2024-05-29 DIAGNOSIS — G89.4 CHRONIC PAIN SYNDROME: ICD-10-CM

## 2024-05-29 DIAGNOSIS — M54.12 CERVICAL RADICULOPATHY: Primary | ICD-10-CM

## 2024-05-29 DIAGNOSIS — M48.061 SPINAL STENOSIS OF LUMBAR REGION WITHOUT NEUROGENIC CLAUDICATION: ICD-10-CM

## 2024-05-29 PROCEDURE — 4004F PT TOBACCO SCREEN RCVD TLK: CPT | Performed by: NURSE PRACTITIONER

## 2024-05-29 PROCEDURE — 3017F COLORECTAL CA SCREEN DOC REV: CPT | Performed by: NURSE PRACTITIONER

## 2024-05-29 PROCEDURE — 99215 OFFICE O/P EST HI 40 MIN: CPT | Performed by: NURSE PRACTITIONER

## 2024-05-29 PROCEDURE — G8417 CALC BMI ABV UP PARAM F/U: HCPCS | Performed by: NURSE PRACTITIONER

## 2024-05-29 PROCEDURE — G8427 DOCREV CUR MEDS BY ELIG CLIN: HCPCS | Performed by: NURSE PRACTITIONER

## 2024-05-29 RX ORDER — TIRZEPATIDE 5 MG/.5ML
INJECTION, SOLUTION SUBCUTANEOUS
COMMUNITY
Start: 2024-05-04

## 2024-05-29 NOTE — PROGRESS NOTES
[facility] PROFESSIONAL SERVS  [facility]UROSCIENCE AND REHABILITATION [address]  [facility] [address]  Dept: [phone]  Dept Fax: [phone]  Loc: [phone]    Visit Date: 5/29/2024    Functionality Assessment/Goals Worksheet     On a scale of 0 (Does not Interfere) to 10 (Completely Interferes)     1.  Which number describes how during the past week pain has interfered with       the following:  A.  General Activity:  8  B.  Mood: 8  C.  Walking Ability:  9  D.  Normal Work (Includes both work outside the home and housework):  8  E.  Relations with Other People:   8  F.  Sleep:   9  G.  Enjoyment of Life:   8    2.  Patient Prefers to Take their Pain Medications:     [x]  On a regular basis   []  Only when necessary    []  Does not take pain medications    3.  What are the Patient's Goals/Expectations for Visiting Pain Management?     [x]  Learn about my pain    [x]  Receive Medication   []  Physical Therapy     [x]  Treat Depression   [x]  Receive Injections    [x]  Treat Sleep   [x]  Deal with Anxiety and Stress   []  Treat Opoid Dependence/Addiction   []  Other:  
in great detail. Discussed compliance in plan of care, medications regimen and appointments in order to continue with clinic and ordered interventions.   The patient was also advised to continue physical therapy and stretching exercises at home and cognitive behavioral and/or group therapy.       Follow-up:   4 weeks after injections     It was my pleasure to evaluate Josy Queen today.  I spent over 45 minutes evaluating this patient, reviewing previous notes and images and completing documentation.       FLO Hawk - CNP   Interventional Pain Management/PM&R   [facility] [place]

## 2024-06-05 ENCOUNTER — TELEPHONE (OUTPATIENT)
Dept: PHYSICAL MEDICINE AND REHAB | Age: 54
End: 2024-06-05

## 2024-06-05 NOTE — TELEPHONE ENCOUNTER
Called patient to inform her that [facility] will pick her up on 06/27/2024 @ 8:15.  No answer, or voice mailbox.

## 2024-06-13 DIAGNOSIS — M06.4 UNDIFFERENTIATED INFLAMMATORY ARTHRITIS (HCC): ICD-10-CM

## 2024-06-13 RX ORDER — SULFASALAZINE 500 MG/1
TABLET ORAL
Qty: 360 TABLET | Refills: 5 | OUTPATIENT
Start: 2024-06-13

## 2024-06-20 NOTE — DISCHARGE INSTRUCTIONS
Post procedure Instructions:    No driving or making significant decisions for the remainder of the day.   Be cautions with walking and activity for 24 hours, do not over exert yourself.  Ok to resume pre-procedure activity level today.  Apply ice to site of injection site if you have pain or discomfort.  Do not submerge sit of injection during bath or pool activities for 48 hours post-procedure.  Resume blood thinning medications in 24 hours.     Call office [phone] if you have:  Temperature greater than 100.4  Persistent nausea and vomiting  Severe uncontrolled pain  Redness, tenderness, or signs of infection (pain, swelling, redness, odor or green/yellow discharge around the site)  Difficulty breathing, headache or visual disturbances  Hives  Persistent dizziness or light-headedness  Extreme fatigue  Any other questions or concerns you may have after discharge    In an emergency, call 911 or go to an Emergency Department at a nearby hospital    “Surgical Site Infections”      How can we work together to prevent Surgical Site Infections?   We would like to thank you for choosing [facility] for your Surgical Care.  Below you will find helpful information on how we can work together to prevent Surgical Site Infections.    What is a Surgical Site Infection (SSI)?  A surgical site infection is an infection that occurs after surgery in the part of the body where the surgery took place. Most patients who have surgery do not develop an infection. However, infections develop in about 1 to 3 out of every 100 patients who have surgery.  Some of the common symptoms of a surgical site infection are:  Redness and pain around the area where you had surgery  Drainage of cloudy fluid from your surgical wound  Fever    Can SSIs be treated?  Yes. Most surgical site infections can be treated with antibiotics. The antibiotic given to you depends on the bacteria (germs) causing the infection. Sometimes patients with

## 2024-06-26 NOTE — H&P
she does have significant weakness in her left leg, does have foot drop in that leg that has been occurring for years due to her MS.  She states she does try to do her home exercises and stretches as best she can.  She states pain does wake her up at night, interferes with her daily life.    Patient also complains of bilateral neck pain, that does radiate down both arms to her forearms.  She states that pain has been occurring for years.  She denies any initial accident, injury, falls that caused her pain.  She describes this pain as a burning, dull, aching pain that comes and goes.  She states pain is worse with turning, nighttime, when her spasms occur.  She states nothing in particular seems to help the pain.  She states she does sleep with a neck pillow to try to prevent her from turning.  She has not noticed any significant weakness with her arm pain.  She states her pain does interfere with her daily life, sleeping.  She states her daughter will wake her up at night as she does not even realize that she is screaming in the middle of the night due to her pain.  Again she states she has done therapy, yoga, home stretches and exercises but that at times will make her pain worse when she recently did therapy she had to stop due to her MS flareup.  She does follow advanced neuro in [facility], Dr. Ronald Daley for her MS.  She did recently have an EMG there. Pain scale : at worst pain is 10/10, at best pain is 7/10.     Today, 5/29/2024, patient presents for planned procedural follow-up.  She completed lumbar epidural steroid injection L5-S1 with Dr. Ching on 5/1/2024.  She states that procedure went very well, she had no issues with the procedure itself.  She states she did have a slight headache for a few days after the procedure, but it was tolerable with Tylenol and went away after few days.  She states then she started noticing significant benefit in her low back and leg pain, states she is

## 2024-06-27 ENCOUNTER — APPOINTMENT (OUTPATIENT)
Dept: GENERAL RADIOLOGY | Age: 54
End: 2024-06-27
Attending: ANESTHESIOLOGY
Payer: MEDICARE

## 2024-06-27 ENCOUNTER — HOSPITAL ENCOUNTER (OUTPATIENT)
Age: 54
Setting detail: OUTPATIENT SURGERY
Discharge: HOME | End: 2024-06-27
Attending: ANESTHESIOLOGY | Admitting: ANESTHESIOLOGY
Payer: MEDICARE

## 2024-06-27 VITALS
RESPIRATION RATE: 14 BRPM | SYSTOLIC BLOOD PRESSURE: 138 MMHG | TEMPERATURE: 97.8 F | HEART RATE: 76 BPM | OXYGEN SATURATION: 93 % | DIASTOLIC BLOOD PRESSURE: 59 MMHG | BODY MASS INDEX: 47.09 KG/M2 | HEIGHT: 66 IN | WEIGHT: 293 LBS

## 2024-06-27 LAB — GLUCOSE BLD STRIP.AUTO-MCNC: 143 MG/DL (ref 70–108)

## 2024-06-27 PROCEDURE — 62321 NJX INTERLAMINAR CRV/THRC: CPT | Performed by: ANESTHESIOLOGY

## 2024-06-27 PROCEDURE — 2709999900 HC NON-CHARGEABLE SUPPLY: Performed by: ANESTHESIOLOGY

## 2024-06-27 PROCEDURE — 99152 MOD SED SAME PHYS/QHP 5/>YRS: CPT | Performed by: ANESTHESIOLOGY

## 2024-06-27 PROCEDURE — 7100000010 HC PHASE II RECOVERY - FIRST 15 MIN: Performed by: ANESTHESIOLOGY

## 2024-06-27 PROCEDURE — 2500000003 HC RX 250 WO HCPCS: Performed by: ANESTHESIOLOGY

## 2024-06-27 PROCEDURE — 6360000002 HC RX W HCPCS: Performed by: ANESTHESIOLOGY

## 2024-06-27 PROCEDURE — 6360000004 HC RX CONTRAST MEDICATION: Performed by: ANESTHESIOLOGY

## 2024-06-27 PROCEDURE — 3600000054 HC PAIN LEVEL 3 BASE: Performed by: ANESTHESIOLOGY

## 2024-06-27 PROCEDURE — 7100000011 HC PHASE II RECOVERY - ADDTL 15 MIN: Performed by: ANESTHESIOLOGY

## 2024-06-27 PROCEDURE — 82948 REAGENT STRIP/BLOOD GLUCOSE: CPT

## 2024-06-27 RX ORDER — DEXAMETHASONE SODIUM PHOSPHATE 4 MG/ML
INJECTION, SOLUTION INTRA-ARTICULAR; INTRALESIONAL; INTRAMUSCULAR; INTRAVENOUS; SOFT TISSUE PRN
Status: DISCONTINUED | OUTPATIENT
Start: 2024-06-27 | End: 2024-06-27 | Stop reason: ALTCHOICE

## 2024-06-27 RX ORDER — ACYCLOVIR 200 MG/1
CAPSULE ORAL PRN
Status: DISCONTINUED | OUTPATIENT
Start: 2024-06-27 | End: 2024-06-27 | Stop reason: ALTCHOICE

## 2024-06-27 RX ORDER — FENTANYL CITRATE 50 UG/ML
INJECTION, SOLUTION INTRAMUSCULAR; INTRAVENOUS PRN
Status: DISCONTINUED | OUTPATIENT
Start: 2024-06-27 | End: 2024-06-27 | Stop reason: ALTCHOICE

## 2024-06-27 RX ORDER — IOPAMIDOL 612 MG/ML
INJECTION, SOLUTION INTRATHECAL PRN
Status: DISCONTINUED | OUTPATIENT
Start: 2024-06-27 | End: 2024-06-27 | Stop reason: ALTCHOICE

## 2024-06-27 RX ORDER — LIDOCAINE HYDROCHLORIDE 10 MG/ML
INJECTION, SOLUTION EPIDURAL; INFILTRATION; INTRACAUDAL; PERINEURAL PRN
Status: DISCONTINUED | OUTPATIENT
Start: 2024-06-27 | End: 2024-06-27 | Stop reason: ALTCHOICE

## 2024-06-27 RX ORDER — MIDAZOLAM HYDROCHLORIDE 1 MG/ML
INJECTION INTRAMUSCULAR; INTRAVENOUS PRN
Status: DISCONTINUED | OUTPATIENT
Start: 2024-06-27 | End: 2024-06-27 | Stop reason: ALTCHOICE

## 2024-06-27 ASSESSMENT — PAIN DESCRIPTION - DESCRIPTORS: DESCRIPTORS: ACHING;STABBING;SHARP

## 2024-06-27 ASSESSMENT — PAIN - FUNCTIONAL ASSESSMENT: PAIN_FUNCTIONAL_ASSESSMENT: 0-10

## 2024-06-27 NOTE — PRE SEDATION
sulfaSALAzine (AZULFIDINE) 500 MG tablet Take 3 tablets by mouth 2 times daily 5/3/24   Leo Lazo DO   KESIMPTA 20 MG/0.4ML SOAJ  1/11/24   Douglas Bobby MD   pantoprazole (PROTONIX) 40 MG tablet Take 1 tablet by mouth every morning (before breakfast) 10/24/23 5/1/24  Viktoria Tobias APRN - CNP   vitamin E 400 UNIT capsule Take 1 capsule by mouth 2 times daily 9/8/22   Brittany Ann APRN - CNP   dicyclomine (BENTYL) 10 MG capsule Take 1 capsule by mouth 4 times daily as needed (abdominal cramping) 9/8/22   Brittany Ann APRN - CNP   clonazePAM (KLONOPIN) 1 MG tablet Take 1 tablet by mouth 2 times daily as needed.    Douglas Bobby MD   losartan (COZAAR) 100 MG tablet TAKE 1 TABLET BY MOUTH EVERY DAY 4/2/22   Douglas Bobby MD   metoprolol succinate (TOPROL XL) 25 MG extended release tablet TAKE 1 TABLET BY MOUTH EVERY DAY 5/26/22   Douglas Bobby MD   Cholecalciferol (VITAMIN D3) 1.25 MG (82428 UT) CAPS Take by mouth daily    Douglas Bobby MD   glimepiride (AMARYL) 2 MG tablet Take 1 tablet by mouth every morning (before breakfast)    Douglas Bobby MD   empagliflozin (JARDIANCE) 25 MG tablet Take 1 tablet by mouth daily    Douglas Bobby MD   ezetimibe (ZETIA) 10 MG tablet Take 1 tablet by mouth daily    Douglas Bobby MD   LYRICA 150 MG capsule TAKE ONE CAPSULE BY MOUTH FOUR TIMES DAILY  Patient taking differently: Take 1 capsule by mouth 2 times daily. 9/1/16   Frances Carrera MD   PARoxetine (PAXIL) 20 MG tablet Take 4 tablets by mouth every morning    Douglas Bobby MD   fluticasone (FLONASE) 50 MCG/ACT nasal spray USE 2 SPRAYS IN EACH NOSTRIL EVERY DAY 11/4/15   Frances Carrera MD   pravastatin (PRAVACHOL) 40 MG tablet Take 1 tablet by mouth daily    Douglas Bobby MD     PHYSICAL:   Vitals:    06/27/24 0919   BP: (!) 138/59   Pulse: 76   Resp: 14   Temp: 97.8 °F (36.6 °C)   SpO2: 93%     PLANNED PROCEDURE   See procedure

## 2024-06-27 NOTE — PROGRESS NOTES
0918: Patient arrived to Phase II recovery via cart. Awake and alert. Report received from JULIAN Pulido.  0919: VSS. Patient denies pain. HOB elevated. Drink provided. Call light placed in reach.  0934: IV removed. Patient sat edge of bed and dressed independently.  0940: Patient sitting in bed with no complaints. Awaiting transportation.  1000: Patient escorted to vehicle via wheelchair and discharged [facility]

## 2024-06-27 NOTE — PROCEDURES
Pre-operative Diagnosis: Radicular arm pain     Post-operative Diagnosis: Radicular arm pain     Procedure: Cervical epidural steroid injection    Procedure Description:  After having obtained a signed informed consent, the patient was taken to the fluoroscopy suite and placed in the prone position.  The neck was prepped with chloraprep and draped in a sterile fashion.  A total of 1 cc of  1% lidocaine was used to anesthetize the skin and underlying tissues.  Under fluoroscopic guidance, a single 20G Tuohy needle was advanced midline at the C6/C7 interspace until gaining the epidural space using the loss of resistance to saline syringe technique.  There were no paresthesias, heme, or CSF aspiration. A total of 0.25 cc of Omnipaque 300 were injected having had adequate dye spread within the epidural space. Needle placement was confirmed using the AP and contra-lateral oblique views.  10 mg of dexamethasone flushed with 0.25 cc of saline solution were injected in the epidural space. The needle was removed without any complication.  The patient tolerated the procedure well and was transported to the recovery room where he was observed for 15 minutes to then be discharged in ambulatory fashion.      Procedural Complications: None  Estimated Blood Loss: 0 mL      IV sedation was used during the procedure:  - Moderate intravenous conscious sedation was supervised by Dr. Ching  - The patient was independently monitored by a Registered Nurse assigned to the procedure room  - Monitoring included automated blood pressure, continuous EKG, and continuous pulse oximetry  - The detailed conscious record is permanently stored in the Hospital Information System  - The following is the conscious sedation record:  Start Time: 09:12  End Time : 09:27  Duration: 15 minutes   Medications Administered: 2 mg Versed, 100 mcg Fentanyl      Benson Ching DO  Interventional Pain Management/PM&R   [facility]

## 2024-06-27 NOTE — POST SEDATION
[facility]  Sedation/Analgesia Post Sedation Record    Pt Name: Josy Queen  MRN: [id number]  YOB: 1970  Procedure Performed By: Benson Ching DO  Primary Care Physician: Jw Braga MD    POST-PROCEDURE    Physicians/Assistants: Benson Ching DO  Procedure Performed: See Procedure Note   Sedation/Anesthesia: Versed and Fentanyl (See procedure note for amount and duration)  Estimated Blood Loss:     0  ml  Specimens Removed: None        Complications: None           Benson Ching DO  Electronically signed 6/27/2024 at 2:47 PM

## 2024-07-03 ENCOUNTER — OFFICE VISIT (OUTPATIENT)
Dept: RHEUMATOLOGY | Age: 54
End: 2024-07-03
Payer: MEDICARE

## 2024-07-03 ENCOUNTER — NURSE ONLY (OUTPATIENT)
Dept: LAB | Age: 54
End: 2024-07-03

## 2024-07-03 VITALS
HEART RATE: 87 BPM | BODY MASS INDEX: 47.09 KG/M2 | SYSTOLIC BLOOD PRESSURE: 112 MMHG | DIASTOLIC BLOOD PRESSURE: 70 MMHG | OXYGEN SATURATION: 93 % | HEIGHT: 66 IN | WEIGHT: 293 LBS

## 2024-07-03 DIAGNOSIS — M25.512 CHRONIC LEFT SHOULDER PAIN: ICD-10-CM

## 2024-07-03 DIAGNOSIS — M15.9 OSTEOARTHRITIS OF MULTIPLE JOINTS, UNSPECIFIED OSTEOARTHRITIS TYPE: ICD-10-CM

## 2024-07-03 DIAGNOSIS — Z51.81 MEDICATION MONITORING ENCOUNTER: ICD-10-CM

## 2024-07-03 DIAGNOSIS — M54.40 CHRONIC MIDLINE LOW BACK PAIN WITH SCIATICA, SCIATICA LATERALITY UNSPECIFIED: ICD-10-CM

## 2024-07-03 DIAGNOSIS — M25.511 CHRONIC RIGHT SHOULDER PAIN: ICD-10-CM

## 2024-07-03 DIAGNOSIS — M54.2 CERVICALGIA: ICD-10-CM

## 2024-07-03 DIAGNOSIS — M06.4 UNDIFFERENTIATED INFLAMMATORY ARTHRITIS (HCC): ICD-10-CM

## 2024-07-03 DIAGNOSIS — M06.4 UNDIFFERENTIATED INFLAMMATORY ARTHRITIS (HCC): Primary | ICD-10-CM

## 2024-07-03 DIAGNOSIS — M48.062 SPINAL STENOSIS OF LUMBAR REGION WITH NEUROGENIC CLAUDICATION: ICD-10-CM

## 2024-07-03 DIAGNOSIS — G89.29 CHRONIC MIDLINE LOW BACK PAIN WITH SCIATICA, SCIATICA LATERALITY UNSPECIFIED: ICD-10-CM

## 2024-07-03 DIAGNOSIS — I73.00 RAYNAUD'S DISEASE WITHOUT GANGRENE: ICD-10-CM

## 2024-07-03 DIAGNOSIS — G89.29 CHRONIC RIGHT SHOULDER PAIN: ICD-10-CM

## 2024-07-03 DIAGNOSIS — M25.612 DECREASED RANGE OF MOTION OF LEFT SHOULDER: ICD-10-CM

## 2024-07-03 DIAGNOSIS — G89.29 CHRONIC LEFT SHOULDER PAIN: ICD-10-CM

## 2024-07-03 LAB
ALBUMIN SERPL BCG-MCNC: 4.4 G/DL (ref 3.5–5.1)
ALP SERPL-CCNC: 90 U/L (ref 38–126)
ALT SERPL W/O P-5'-P-CCNC: 16 U/L (ref 11–66)
ANION GAP SERPL CALC-SCNC: 11 MEQ/L (ref 8–16)
AST SERPL-CCNC: 16 U/L (ref 5–40)
BASOPHILS ABSOLUTE: 0.1 THOU/MM3 (ref 0–0.1)
BASOPHILS NFR BLD AUTO: 0.6 %
BILIRUB SERPL-MCNC: 0.2 MG/DL (ref 0.3–1.2)
BUN SERPL-MCNC: 14 MG/DL (ref 7–22)
CALCIUM SERPL-MCNC: 9.1 MG/DL (ref 8.5–10.5)
CHLORIDE SERPL-SCNC: 105 MEQ/L (ref 98–111)
CO2 SERPL-SCNC: 25 MEQ/L (ref 23–33)
CREAT SERPL-MCNC: 0.8 MG/DL (ref 0.4–1.2)
CRP SERPL-MCNC: 0.79 MG/DL (ref 0–1)
DEPRECATED RDW RBC AUTO: 49.1 FL (ref 35–45)
EOSINOPHIL NFR BLD AUTO: 0 %
EOSINOPHILS ABSOLUTE: 0 THOU/MM3 (ref 0–0.4)
ERYTHROCYTE [DISTWIDTH] IN BLOOD BY AUTOMATED COUNT: 14.9 % (ref 11.5–14.5)
ERYTHROCYTE [SEDIMENTATION RATE] IN BLOOD BY WESTERGREN METHOD: 5 MM/HR (ref 0–20)
GFR SERPL CREATININE-BSD FRML MDRD: 87 ML/MIN/1.73M2
GLUCOSE SERPL-MCNC: 126 MG/DL (ref 70–108)
HCT VFR BLD AUTO: 49.4 % (ref 37–47)
HGB BLD-MCNC: 15.9 GM/DL (ref 12–16)
IGG SERPL-MCNC: 846 MG/DL (ref 700–1600)
IMM GRANULOCYTES # BLD AUTO: 0.08 THOU/MM3 (ref 0–0.07)
IMM GRANULOCYTES NFR BLD AUTO: 0.8 %
LYMPHOCYTES ABSOLUTE: 3.3 THOU/MM3 (ref 1–4.8)
LYMPHOCYTES NFR BLD AUTO: 32 %
MCH RBC QN AUTO: 29 PG (ref 26–33)
MCHC RBC AUTO-ENTMCNC: 32.2 GM/DL (ref 32.2–35.5)
MCV RBC AUTO: 90 FL (ref 81–99)
MONOCYTES ABSOLUTE: 1 THOU/MM3 (ref 0.4–1.3)
MONOCYTES NFR BLD AUTO: 9.3 %
NEUTROPHILS ABSOLUTE: 5.9 THOU/MM3 (ref 1.8–7.7)
NEUTROPHILS NFR BLD AUTO: 57.3 %
NRBC BLD AUTO-RTO: 0 /100 WBC
PLATELET # BLD AUTO: 196 THOU/MM3 (ref 130–400)
PMV BLD AUTO: 12.1 FL (ref 9.4–12.4)
POTASSIUM SERPL-SCNC: 4 MEQ/L (ref 3.5–5.2)
PROT SERPL-MCNC: 6.9 G/DL (ref 6.1–8)
RBC # BLD AUTO: 5.49 MILL/MM3 (ref 4.2–5.4)
SODIUM SERPL-SCNC: 141 MEQ/L (ref 135–145)
WBC # BLD AUTO: 10.3 THOU/MM3 (ref 4.8–10.8)

## 2024-07-03 PROCEDURE — G8417 CALC BMI ABV UP PARAM F/U: HCPCS | Performed by: NURSE PRACTITIONER

## 2024-07-03 PROCEDURE — 99214 OFFICE O/P EST MOD 30 MIN: CPT | Performed by: NURSE PRACTITIONER

## 2024-07-03 PROCEDURE — G2211 COMPLEX E/M VISIT ADD ON: HCPCS | Performed by: NURSE PRACTITIONER

## 2024-07-03 PROCEDURE — 3017F COLORECTAL CA SCREEN DOC REV: CPT | Performed by: NURSE PRACTITIONER

## 2024-07-03 PROCEDURE — G8427 DOCREV CUR MEDS BY ELIG CLIN: HCPCS | Performed by: NURSE PRACTITIONER

## 2024-07-03 PROCEDURE — 4004F PT TOBACCO SCREEN RCVD TLK: CPT | Performed by: NURSE PRACTITIONER

## 2024-07-03 RX ORDER — CLINDAMYCIN PHOSPHATE 11.9 MG/ML
SOLUTION TOPICAL
COMMUNITY
Start: 2024-02-15

## 2024-07-03 RX ORDER — PAROXETINE HYDROCHLORIDE 40 MG/1
TABLET, FILM COATED ORAL
COMMUNITY
Start: 2024-05-26

## 2024-07-03 RX ORDER — PRAVASTATIN SODIUM 80 MG/1
TABLET ORAL
COMMUNITY
Start: 2024-05-15

## 2024-07-03 RX ORDER — BLOOD-GLUCOSE METER
EACH MISCELLANEOUS
COMMUNITY
Start: 2024-05-09

## 2024-07-03 RX ORDER — LANCETS 30 GAUGE
EACH MISCELLANEOUS
COMMUNITY
Start: 2024-05-10

## 2024-07-03 RX ORDER — BEPOTASTINE BESILATE 15 MG/ML
SOLUTION/ DROPS OPHTHALMIC
COMMUNITY
Start: 2024-05-15

## 2024-07-03 RX ORDER — BLOOD SUGAR DIAGNOSTIC
STRIP MISCELLANEOUS
COMMUNITY
Start: 2024-05-09

## 2024-07-03 RX ORDER — OFATUMUMAB 20 MG/.4ML
INJECTION, SOLUTION SUBCUTANEOUS
COMMUNITY

## 2024-07-03 ASSESSMENT — ENCOUNTER SYMPTOMS
EYE PAIN: 0
EYE ITCHING: 0
BACK PAIN: 1
COUGH: 0
TROUBLE SWALLOWING: 0
CONSTIPATION: 0
NAUSEA: 0
SHORTNESS OF BREATH: 0
ABDOMINAL PAIN: 0
DIARRHEA: 0

## 2024-07-03 NOTE — PROGRESS NOTES
07:28 AM         Lab Results   Component Value Date    ANASCRN None Detected 09/21/2021     Lab Results   Component Value Date    SSA SEE BELOW 09/21/2021     Lab Results   Component Value Date    SSB 0 09/21/2021     No results found for: \"ANTI-SMITH\"  No results found for: \"DSDNAAB\"   No results found for: \"ANTIRNP\"  No results found for: \"C3\", \"C4\"  No results found for: \"CCPAB\"  Lab Results   Component Value Date    RF < 10 09/21/2021       No components found for: \"CANCASCRN\", \"APANCASCRN\"  Lab Results   Component Value Date    SEDRATE 61 (H) 12/20/2023     Lab Results   Component Value Date    CRP 0.48 12/20/2023       RADIOLOGY:         ASSESSMENT/PLAN    Assessment   Plan     Undiff inflammatory arthritis  - + IBS-D, daughter w/ crohn's, + inflammatory back pain and mechanical symptoms. Persistent joint pains bilat upper extremities. + warmth left shoulder. No overt synovitis. + raynauds. Neg AVISE CTD panel.   - prior tx: prednisone (relief of joint and rash)   - sulfasalazine 1500 mg BID (5/2023)    Osteoarthritis of multiple joints- stable    Chronic low back pain with left sided sciatica  Lumbar spinal stenosis   - SI joints w/o sig abnormalities. L spine with facet joint arthropathy. Some osteophytic changes in the spine. Xrays SI joints without abnormalities. MRI lumbar spine w/ moderate spinal stenosis and severe facet degenerative changes    - following with pain management     Bilateral shoulder pain L>R   - xray left shoulder w/ mild degenerative arthritic changes. Has been attempting home exercises with worsened pain and inabillity to perform them   - MRI left shoulder ordered    Rash- red papular/plaque right elbow- resolved    Raynauds disease without gangrene  - intermittent blistering type lesion reported. + reported raynauds, and scabbed sore along the distal right 2nd toe (? Related to raynauds vs trauma). Neg AVISE panel.     Cervicalgia  - sig foraminal stenosis of cervical MRI could be

## 2024-07-10 DIAGNOSIS — M06.4 UNDIFFERENTIATED INFLAMMATORY ARTHRITIS (HCC): ICD-10-CM

## 2024-07-11 RX ORDER — SULFASALAZINE 500 MG/1
1500 TABLET ORAL 2 TIMES DAILY
Qty: 180 TABLET | Refills: 1 | Status: SHIPPED | OUTPATIENT
Start: 2024-07-11

## 2024-07-11 NOTE — TELEPHONE ENCOUNTER
DOLV: 7/3/24  DONV: 10/3/24  LAST LAB DRAW: 7/3/24  LAST TB TEST: 10/25/23    Lab Results   Component Value Date     07/03/2024    K 4.0 07/03/2024     07/03/2024    CO2 25 07/03/2024    BUN 14 07/03/2024    CREATININE 0.8 07/03/2024    GLUCOSE 126 (H) 07/03/2024    CALCIUM 9.1 07/03/2024    BILITOT 0.2 (L) 07/03/2024    ALKPHOS 90 07/03/2024    AST 16 07/03/2024    ALT 16 07/03/2024    LABGLOM 87 07/03/2024    AGRATIO 1.5 03/28/2023       Recent Labs     07/03/24  1300   WBC 10.3   HGB 15.9   HCT 49.4*   MCV 90.0          Lab Results   Component Value Date    SEDRATE 5 07/03/2024       Lab Results   Component Value Date    CRP 0.79 07/03/2024

## 2024-07-12 ENCOUNTER — HOSPITAL ENCOUNTER (OUTPATIENT)
Dept: MRI IMAGING | Age: 54
Discharge: HOME | End: 2024-07-12
Payer: MEDICARE

## 2024-07-12 DIAGNOSIS — M25.612 DECREASED RANGE OF MOTION OF LEFT SHOULDER: ICD-10-CM

## 2024-07-12 DIAGNOSIS — M25.511 CHRONIC RIGHT SHOULDER PAIN: ICD-10-CM

## 2024-07-12 DIAGNOSIS — G35 RELAPSING REMITTING MULTIPLE SCLEROSIS (HCC): ICD-10-CM

## 2024-07-12 DIAGNOSIS — G89.29 CHRONIC RIGHT SHOULDER PAIN: ICD-10-CM

## 2024-07-12 PROCEDURE — A9579 GAD-BASE MR CONTRAST NOS,1ML: HCPCS

## 2024-07-12 PROCEDURE — 73221 MRI JOINT UPR EXTREM W/O DYE: CPT

## 2024-07-12 PROCEDURE — 6360000004 HC RX CONTRAST MEDICATION

## 2024-07-12 PROCEDURE — 70553 MRI BRAIN STEM W/O & W/DYE: CPT

## 2024-07-12 RX ADMIN — GADOTERIDOL 20 ML: 279.3 INJECTION, SOLUTION INTRAVENOUS at 11:22

## 2024-08-15 ENCOUNTER — OFFICE VISIT (OUTPATIENT)
Dept: PHYSICAL MEDICINE AND REHAB | Age: 54
End: 2024-08-15

## 2024-08-15 VITALS
HEIGHT: 66 IN | WEIGHT: 293 LBS | DIASTOLIC BLOOD PRESSURE: 78 MMHG | SYSTOLIC BLOOD PRESSURE: 118 MMHG | BODY MASS INDEX: 47.09 KG/M2

## 2024-08-15 DIAGNOSIS — M79.2 NEUROPATHIC PAIN: ICD-10-CM

## 2024-08-15 DIAGNOSIS — G89.4 CHRONIC PAIN SYNDROME: ICD-10-CM

## 2024-08-15 DIAGNOSIS — M47.816 FACET HYPERTROPHY OF LUMBAR REGION: ICD-10-CM

## 2024-08-15 DIAGNOSIS — M43.16 ANTEROLISTHESIS OF LUMBAR SPINE: ICD-10-CM

## 2024-08-15 DIAGNOSIS — M47.816 LUMBAR SPONDYLOSIS: ICD-10-CM

## 2024-08-15 DIAGNOSIS — M79.18 MYOFASCIAL PAIN: ICD-10-CM

## 2024-08-15 DIAGNOSIS — G35 MULTIPLE SCLEROSIS, RELAPSING-REMITTING (HCC): ICD-10-CM

## 2024-08-15 DIAGNOSIS — M54.12 CERVICAL RADICULITIS: ICD-10-CM

## 2024-08-15 DIAGNOSIS — M53.3 SACROILIAC JOINT PAIN: ICD-10-CM

## 2024-08-15 DIAGNOSIS — M48.061 SPINAL STENOSIS OF LUMBAR REGION WITHOUT NEUROGENIC CLAUDICATION: ICD-10-CM

## 2024-08-15 DIAGNOSIS — M51.36 BULGING LUMBAR DISC: ICD-10-CM

## 2024-08-15 DIAGNOSIS — M54.12 CERVICAL RADICULOPATHY: ICD-10-CM

## 2024-08-15 DIAGNOSIS — M53.3 SI (SACROILIAC) JOINT DYSFUNCTION: ICD-10-CM

## 2024-08-15 DIAGNOSIS — M54.16 LUMBAR RADICULOPATHY: Primary | ICD-10-CM

## 2024-08-15 NOTE — PROGRESS NOTES
Functionality Assessment/Goals Worksheet     On a scale of 0 (Does not Interfere) to 10 (Completely Interferes)     1.  Which number describes how during the past week pain has interfered with           the following:  A.  General Activity:  8    B.  Mood: 10  C.  Walking Ability:  8  D.  Normal Work (Includes both work outside the home and housework):  8  E.  Relations with Other People:   6  F.  Sleep:   10  G.  Enjoyment of Life:   8    2.  Patient Prefers to Take their Pain Medications:     [x]  On a regular basis   []  Only when necessary    []  Does not take pain medications    3.  What are the Patient's Goals/Expectations for Visiting Pain Management?     [x]  Learn about my pain    [x]  Receive Medication   []  Physical Therapy     []  Treat Depression   []  Receive Injections    []  Treat Sleep   [x]  Deal with Anxiety and Stress   []  Treat Opoid Dependence/Addiction   []  Other:

## 2024-08-15 NOTE — PROGRESS NOTES
Chronic Pain/PM&R Clinic Note     Encounter Date: 8/15/24    Subjective:   Chief Complaint:   Chief Complaint   Patient presents with    Follow-up     Procedure follow up        History of Present Illness:   Josy Queen is a 54 y.o. female seen in the clinic initially on 02/22/2024 upon request from FLO Mace for her history of low back pain. Patient has a personal medical history of cancer, depression, dysphagia, CAD, GERD, hyperlipidemia, lupus, DMT2, peripheral neuropathy, fatty liver, multiple sclerosis, osteoarthritis, inflammatory arthritis.    Patient is complaining of bilateral low back pain, left side is worse than right.  She states her pain will radiate down her left leg to her toes.  She states when pain is very severe it will affect both legs as well.  She states this pain has been occurring for several years.  She denies any initial accident, injury, fall that caused her pain.  She describes the pain as a pressure, aching, tooth ache pain, feels like she has something dropped on her back.  She states it is a constant pain.  She states when becomes severe it would also turn into a shocking, cold water sensation running down her left leg.  She states pain is worse with turning to the left, bending, when her muscle spasms occur, when walking.  She states pain is somewhat better with pain spray, topicals, using Klonopin for spasticity, rest, ice.  She states she has tried yoga in the past, physical therapy, but with her hypomobility she was told not to do this very often.  She states she was in therapy, but then she did have a MS flareup and it was making her pain worse.  She states she also does have neuropathy in both her feet, they are unsure the cause with this as her diabetes is well-controlled, but they also are wondering if it was due to a previous MS medication.  She states that she does have significant weakness in her left leg, does have foot drop in that leg that has been

## 2024-10-03 ENCOUNTER — OFFICE VISIT (OUTPATIENT)
Dept: RHEUMATOLOGY | Age: 54
End: 2024-10-03
Payer: MEDICARE

## 2024-10-03 VITALS
BODY MASS INDEX: 47.09 KG/M2 | WEIGHT: 293 LBS | HEIGHT: 66 IN | SYSTOLIC BLOOD PRESSURE: 124 MMHG | OXYGEN SATURATION: 96 % | DIASTOLIC BLOOD PRESSURE: 82 MMHG | HEART RATE: 72 BPM

## 2024-10-03 DIAGNOSIS — G89.29 CHRONIC RIGHT SHOULDER PAIN: ICD-10-CM

## 2024-10-03 DIAGNOSIS — M06.4 UNDIFFERENTIATED INFLAMMATORY ARTHRITIS (HCC): Primary | ICD-10-CM

## 2024-10-03 DIAGNOSIS — M25.552 LEFT HIP PAIN: ICD-10-CM

## 2024-10-03 DIAGNOSIS — G89.29 CHRONIC MIDLINE LOW BACK PAIN WITH SCIATICA, SCIATICA LATERALITY UNSPECIFIED: ICD-10-CM

## 2024-10-03 DIAGNOSIS — R19.7 DIARRHEA, UNSPECIFIED TYPE: ICD-10-CM

## 2024-10-03 DIAGNOSIS — M25.511 CHRONIC RIGHT SHOULDER PAIN: ICD-10-CM

## 2024-10-03 DIAGNOSIS — M54.40 CHRONIC MIDLINE LOW BACK PAIN WITH SCIATICA, SCIATICA LATERALITY UNSPECIFIED: ICD-10-CM

## 2024-10-03 DIAGNOSIS — Z51.81 MEDICATION MONITORING ENCOUNTER: ICD-10-CM

## 2024-10-03 PROCEDURE — G8484 FLU IMMUNIZE NO ADMIN: HCPCS | Performed by: INTERNAL MEDICINE

## 2024-10-03 PROCEDURE — G8427 DOCREV CUR MEDS BY ELIG CLIN: HCPCS | Performed by: INTERNAL MEDICINE

## 2024-10-03 PROCEDURE — G8417 CALC BMI ABV UP PARAM F/U: HCPCS | Performed by: INTERNAL MEDICINE

## 2024-10-03 PROCEDURE — 99214 OFFICE O/P EST MOD 30 MIN: CPT | Performed by: INTERNAL MEDICINE

## 2024-10-03 PROCEDURE — 4004F PT TOBACCO SCREEN RCVD TLK: CPT | Performed by: INTERNAL MEDICINE

## 2024-10-03 PROCEDURE — 3017F COLORECTAL CA SCREEN DOC REV: CPT | Performed by: INTERNAL MEDICINE

## 2024-10-03 RX ORDER — SULFASALAZINE 500 MG/1
1500 TABLET ORAL 2 TIMES DAILY
Qty: 540 TABLET | Refills: 0 | Status: SHIPPED | OUTPATIENT
Start: 2024-10-03

## 2024-10-03 ASSESSMENT — ENCOUNTER SYMPTOMS
DIARRHEA: 1
RESPIRATORY NEGATIVE: 1
EYES NEGATIVE: 1

## 2024-10-03 NOTE — PROGRESS NOTES
[facility] RHEUMATOLOGY FOLLOW UP NOTE       Date Of Service: 10/3/2024  Provider: BERNA RODRIGUEZ DO    Name: Josy Queen   MRN: [id number]    Chief Complaint(s)     Chief Complaint   Patient presents with    Follow-up     3-month f/u for undifferentiated inflammatory arthritis. Patient states she has had 2 falls since the last time she was see. Patient has a torn rotator cuff in the left shoulder, not from the fall. Since being seen last has been diagnosed with ophthalmic migraines.         History of Present Illness (HPI)     Josy Queen  is a(n)54 y.o. female with a hx of cancer, chronic pain syndrome, depression, double vision, dysphagia, esophageal stricture, CAD, GERD, hearing loss,  hemorrhoid, hidradentitis suppurativa, hyperlipidemia, lupus, sleep apnea, morbid obesity, T2DM w/ peripheral neuropathy, fatty liver, multiple sclerosis, spinal stenosis cervical spine, neuropathy, polycystic ovary disease, raynauds, sinus infection, tobacco dependence, osteoarthritis here for the f/u evaluation of inflammatory arthritis    Interval hx:     - diagnosed with left rotator cuff tear and labral tear- Dr. Reyna's office performe steroid injection- scheduled re-evaluation in 2 weeks.     - 2 falls since the last evaluation - once left leg gave way.     pain affecting the hands, wrists, elbows, left shoulder, neck, back, left hip ankles feet  Pain 9/10 - pressure sensation on the lower back.   Timing:mornings   Aggravating factors: weather changes. Back: walking  Alleviating factors: ice packs, tylenol  Am stiffness lasting 2 hours.   B/l thumb apin continues   Reported joint swelling of fingers.   Numbness entire left side of boty.   Diarrhea - intermittent - up to 3 bm's per day. Denies mucous, brpbr, melena     Raynauds worsening with weather changes.     Depression - occasional suicidal thought. No active plans- currently seeing sister as .     REVIEW OF SYSTEMS: (ROS)    Review of Systems

## 2024-12-04 DIAGNOSIS — M06.4 UNDIFFERENTIATED INFLAMMATORY ARTHRITIS (HCC): ICD-10-CM

## 2024-12-04 RX ORDER — SULFASALAZINE 500 MG/1
TABLET ORAL
Qty: 540 TABLET | Refills: 3 | OUTPATIENT
Start: 2024-12-04

## 2025-02-24 ENCOUNTER — TELEPHONE (OUTPATIENT)
Age: 55
End: 2025-02-24

## 2025-02-24 DIAGNOSIS — Z51.81 MEDICATION MONITORING ENCOUNTER: Primary | ICD-10-CM

## 2025-02-24 NOTE — TELEPHONE ENCOUNTER
Patient is requesting new standing orders for labs so she can get them done prior to her appt on 04/03/2025

## 2025-02-24 NOTE — TELEPHONE ENCOUNTER
Last office note states labs q 8 weeks and standing orders are stating q 12. Both dated 10/3/24. Which schedule is the patient?

## 2025-04-03 ENCOUNTER — OFFICE VISIT (OUTPATIENT)
Age: 55
End: 2025-04-03
Payer: MEDICARE

## 2025-04-03 VITALS
DIASTOLIC BLOOD PRESSURE: 82 MMHG | BODY MASS INDEX: 47.09 KG/M2 | SYSTOLIC BLOOD PRESSURE: 136 MMHG | HEART RATE: 118 BPM | WEIGHT: 293 LBS | HEIGHT: 66 IN | OXYGEN SATURATION: 95 %

## 2025-04-03 DIAGNOSIS — M25.552 LEFT HIP PAIN: ICD-10-CM

## 2025-04-03 DIAGNOSIS — G89.29 CHRONIC RIGHT SHOULDER PAIN: ICD-10-CM

## 2025-04-03 DIAGNOSIS — M48.062 SPINAL STENOSIS OF LUMBAR REGION WITH NEUROGENIC CLAUDICATION: ICD-10-CM

## 2025-04-03 DIAGNOSIS — Z51.81 MEDICATION MONITORING ENCOUNTER: ICD-10-CM

## 2025-04-03 DIAGNOSIS — M25.512 CHRONIC LEFT SHOULDER PAIN: ICD-10-CM

## 2025-04-03 DIAGNOSIS — G89.29 CHRONIC LEFT SHOULDER PAIN: ICD-10-CM

## 2025-04-03 DIAGNOSIS — M54.40 CHRONIC MIDLINE LOW BACK PAIN WITH SCIATICA, SCIATICA LATERALITY UNSPECIFIED: ICD-10-CM

## 2025-04-03 DIAGNOSIS — I73.00 RAYNAUD'S DISEASE WITHOUT GANGRENE: ICD-10-CM

## 2025-04-03 DIAGNOSIS — G89.29 CHRONIC MIDLINE LOW BACK PAIN WITH SCIATICA, SCIATICA LATERALITY UNSPECIFIED: ICD-10-CM

## 2025-04-03 DIAGNOSIS — M19.90 UNDIFFERENTIATED INFLAMMATORY ARTHRITIS: Primary | ICD-10-CM

## 2025-04-03 DIAGNOSIS — M25.511 CHRONIC RIGHT SHOULDER PAIN: ICD-10-CM

## 2025-04-03 PROCEDURE — 99214 OFFICE O/P EST MOD 30 MIN: CPT | Performed by: NURSE PRACTITIONER

## 2025-04-03 PROCEDURE — G8417 CALC BMI ABV UP PARAM F/U: HCPCS | Performed by: NURSE PRACTITIONER

## 2025-04-03 PROCEDURE — 3017F COLORECTAL CA SCREEN DOC REV: CPT | Performed by: NURSE PRACTITIONER

## 2025-04-03 PROCEDURE — G2211 COMPLEX E/M VISIT ADD ON: HCPCS | Performed by: NURSE PRACTITIONER

## 2025-04-03 PROCEDURE — G8427 DOCREV CUR MEDS BY ELIG CLIN: HCPCS | Performed by: NURSE PRACTITIONER

## 2025-04-03 PROCEDURE — 4004F PT TOBACCO SCREEN RCVD TLK: CPT | Performed by: NURSE PRACTITIONER

## 2025-04-03 ASSESSMENT — ENCOUNTER SYMPTOMS
NAUSEA: 0
CONSTIPATION: 0
SHORTNESS OF BREATH: 0
EYE PAIN: 0
BACK PAIN: 1
EYE ITCHING: 0
TROUBLE SWALLOWING: 0
ABDOMINAL PAIN: 0
COUGH: 0
DIARRHEA: 0

## 2025-04-03 NOTE — PROGRESS NOTES
[facility] RHEUMATOLOGY FOLLOW UP NOTE       Date Of Service: 4/3/2025  Provider: Jacquelyn Fernandes, APRN - CNP    Name: Josy Queen   MRN: [id number]    Chief Complaint(s)     Chief Complaint   Patient presents with    Follow-up     6 month follow up inflammatory arthritis         History of Present Illness (HPI)     Josy Queen  is a(n)54 y.o. female with a hx of cancer, chronic pain syndrome, depression, double vision, dysphagia, esophageal stricture, CAD, GERD, hearing loss, hematochezia, hemorrhoid, hidradentitis suppurativa, hyperlipidemia, lupus, sleep apnea, morbid obesity, T2DM w/ peripheral neuropathy, fatty liver, multiple sclerosis, spinal stenosis cervical spine, neuropathy, polycystic ovary disease, raynauds, sinus infection, tobacco dependence, osteoarthritis here for the f/u evaluation of inflammatory arthritis    Interval hx:    - continued joint issues   - reports left hip is better than left evaluation- feels like it is muscle spasms due to the MS. Did not have xray completed     pain affecting the thumbs, fingers, shoulders, hips, knees, feet, low back   Pain on a scale 0-10: 8.5/10  Type of pain: generalized  Timing:none  Aggravating factors: weather changes. Back: walking  Alleviating factors: ice packs, tylenol    Associated symptoms:  + swelling/  Redness/ warmth (hands), + AM stiffness lasting ~ 2 hours       REVIEW OF SYSTEMS: (ROS)    Review of Systems   Constitutional:  Negative for fatigue, fever and unexpected weight change.   HENT:  Negative for congestion and trouble swallowing.    Eyes:  Negative for pain and itching.   Respiratory:  Negative for cough and shortness of breath.    Cardiovascular:  Negative for chest pain and leg swelling.   Gastrointestinal:  Negative for abdominal pain, constipation, diarrhea and nausea.   Endocrine: Negative for cold intolerance and heat intolerance.   Genitourinary:  Negative for difficulty urinating, frequency and urgency.

## 2025-05-30 ENCOUNTER — TRANSCRIBE ORDERS (OUTPATIENT)
Dept: ADMINISTRATIVE | Age: 55
End: 2025-05-30

## 2025-05-30 DIAGNOSIS — G35 ACUTE RELAPSING MULTIPLE SCLEROSIS (HCC): Primary | ICD-10-CM

## 2025-07-14 ENCOUNTER — HOSPITAL ENCOUNTER (OUTPATIENT)
Dept: MRI IMAGING | Age: 55
Discharge: HOME | End: 2025-07-14
Payer: MEDICARE

## 2025-07-14 DIAGNOSIS — G35 ACUTE RELAPSING MULTIPLE SCLEROSIS (HCC): ICD-10-CM

## 2025-07-14 PROCEDURE — A9579 GAD-BASE MR CONTRAST NOS,1ML: HCPCS | Performed by: PSYCHIATRY & NEUROLOGY

## 2025-07-14 PROCEDURE — 72156 MRI NECK SPINE W/O & W/DYE: CPT

## 2025-07-14 PROCEDURE — 70553 MRI BRAIN STEM W/O & W/DYE: CPT

## 2025-07-14 PROCEDURE — 6360000004 HC RX CONTRAST MEDICATION: Performed by: PSYCHIATRY & NEUROLOGY

## 2025-07-14 RX ORDER — GADOTERIDOL 279.3 MG/ML
20 INJECTION INTRAVENOUS
Status: COMPLETED | OUTPATIENT
Start: 2025-07-14 | End: 2025-07-14

## 2025-07-14 RX ADMIN — GADOTERIDOL 20 ML: 279.3 INJECTION, SOLUTION INTRAVENOUS at 19:36

## 2025-08-29 DIAGNOSIS — M19.90 UNDIFFERENTIATED INFLAMMATORY ARTHRITIS: ICD-10-CM

## 2025-08-29 RX ORDER — SULFASALAZINE 500 MG/1
TABLET ORAL
Qty: 540 TABLET | Refills: 3 | OUTPATIENT
Start: 2025-08-29

## (undated) DEVICE — SYRINGE MED 10ML LUERLOCK TIP W/O SFTY DISP

## (undated) DEVICE — Device

## (undated) DEVICE — SYRINGE MEDICAL 3ML CLEAR PLASTIC STANDARD NON CONTROL LUERLOCK TIP DISPOSABLE

## (undated) DEVICE — MARKER,SKIN,WI/RULER AND LABELS: Brand: MEDLINE

## (undated) DEVICE — NEEDLE HYPO 18GA L1.5IN THN WALL PIVOTING SHLD BVL ORIENTED

## (undated) DEVICE — GLOVE BIOGEL POWDER FREE SZ 8

## (undated) DEVICE — NEEDLE SPINAL 22GA L3.5IN SPINOCAN

## (undated) DEVICE — TOWEL,OR,DSP,ST,BLUE,STD,4/PK,20PK/CS: Brand: MEDLINE

## (undated) DEVICE — COUNTER NDL 10 COUNT HLD 20 FOAM BLK SGL MAG

## (undated) DEVICE — 6 ML SYRINGE LUER-LOCK TIP: Brand: MONOJECT